# Patient Record
Sex: MALE | Race: WHITE | NOT HISPANIC OR LATINO | Employment: FULL TIME | URBAN - METROPOLITAN AREA
[De-identification: names, ages, dates, MRNs, and addresses within clinical notes are randomized per-mention and may not be internally consistent; named-entity substitution may affect disease eponyms.]

---

## 2018-06-18 ENCOUNTER — TELEPHONE (OUTPATIENT)
Dept: BARIATRICS | Facility: CLINIC | Age: 48
End: 2018-06-18

## 2018-06-18 NOTE — TELEPHONE ENCOUNTER
Spoke with patient about new office in [facility]  Patient declined to schedule at this time  Advised that I will send him a letter with our contact information

## 2018-07-02 ENCOUNTER — OFFICE VISIT (OUTPATIENT)
Dept: URGENT CARE | Facility: MEDICAL CENTER | Age: 48
End: 2018-07-02
Payer: COMMERCIAL

## 2018-07-02 VITALS — RESPIRATION RATE: 20 BRPM | OXYGEN SATURATION: 96 % | TEMPERATURE: 97 F | HEART RATE: 81 BPM | WEIGHT: 315 LBS

## 2018-07-02 DIAGNOSIS — L23.7 POISON IVY DERMATITIS: Primary | ICD-10-CM

## 2018-07-02 DIAGNOSIS — L03.113 CELLULITIS OF RIGHT UPPER EXTREMITY: ICD-10-CM

## 2018-07-02 PROCEDURE — 99203 OFFICE O/P NEW LOW 30 MIN: CPT | Performed by: PHYSICIAN ASSISTANT

## 2018-07-02 PROCEDURE — S9088 SERVICES PROVIDED IN URGENT: HCPCS | Performed by: PHYSICIAN ASSISTANT

## 2018-07-02 RX ORDER — PREDNISONE 10 MG/1
TABLET ORAL
Qty: 12 TABLET | Refills: 0 | Status: SHIPPED | OUTPATIENT
Start: 2018-07-02 | End: 2021-10-25 | Stop reason: ALTCHOICE

## 2018-07-02 RX ORDER — METHYLPREDNISOLONE SODIUM SUCCINATE 125 MG/2ML
125 INJECTION, POWDER, LYOPHILIZED, FOR SOLUTION INTRAMUSCULAR; INTRAVENOUS ONCE
Status: COMPLETED | OUTPATIENT
Start: 2018-07-02 | End: 2018-07-02

## 2018-07-02 RX ORDER — SULFAMETHOXAZOLE AND TRIMETHOPRIM 800; 160 MG/1; MG/1
1 TABLET ORAL EVERY 12 HOURS SCHEDULED
Qty: 20 TABLET | Refills: 0 | Status: SHIPPED | OUTPATIENT
Start: 2018-07-02 | End: 2018-07-12

## 2018-07-02 RX ADMIN — METHYLPREDNISOLONE SODIUM SUCCINATE 125 MG: 125 INJECTION, POWDER, LYOPHILIZED, FOR SOLUTION INTRAMUSCULAR; INTRAVENOUS at 17:58

## 2018-07-03 NOTE — PROGRESS NOTES
[facility]        NAME: Candelaria River is a 50 y o  male  : 1970    MRN: [id number]  DATE: 2018  TIME: 9:42 PM    Assessment and Plan   Poison ivy dermatitis [L23 7]  1  Poison ivy dermatitis  methylPREDNISolone sodium succinate (Solu-MEDROL) injection 125 mg    predniSONE 10 mg tablet    hydrocortisone 2 5 % cream   2  Cellulitis of right upper extremity  sulfamethoxazole-trimethoprim (BACTRIM DS) 800-160 mg per tablet         Patient Instructions     Solu-medrol given in office  Start prednisone taper tomorrow: 30 mg x 2 days, 20 mg x 2 days, 10 mg x 2 days  Take bactrim twice daily for 10 days  Take with food and eat yogurt or a probiotic to decrease GI upset  Use hydrocortisone cream up to 4 times daily as needed for itching  Watch for signs of worsening infection including increased swelling, redness, drainage, pain, streaking or fevers and follow up for this  Follow up with PCP in 3-5 days  Proceed to  ER if symptoms worsen  Chief Complaint     Chief Complaint   Patient presents with    Rash     Itchy rash on all extremeties  Eyes itchy x 6 days  History of Present Illness       This is a 50year old male presenting for poison ivy rash x 6 days  The rash is pruritic and on bilateral arms, legs, and a few spots on the face  He feels that his eyes are beginning to itch as well  He states that the area on the right forearm has gotten much worse, is red and painful with drainage  No fevers, streaking, shortness of breath  Review of Systems   Review of Systems   Constitutional: Negative for chills, fatigue and fever  Respiratory: Negative for cough and shortness of breath  Musculoskeletal: Negative for joint swelling  Skin: Positive for rash  Neurological: Negative for weakness           Current Medications       Current Outpatient Prescriptions:     hydrocortisone 2 5 % cream, Apply topically 4 (four) times a day as needed for irritation or rash, Disp: 20 g, Rfl: 0    predniSONE 10 mg tablet, Take 30 mg x 2 days, 20 mg x 2 days, 10 mg x 2 days  , Disp: 12 tablet, Rfl: 0    sulfamethoxazole-trimethoprim (BACTRIM DS) 800-160 mg per tablet, Take 1 tablet by mouth every 12 (twelve) hours for 10 days, Disp: 20 tablet, Rfl: 0  No current facility-administered medications for this visit  Current Allergies     Allergies as of 07/02/2018 - Reviewed 07/02/2018   Allergen Reaction Noted    Amoxicillin  05/07/2012            The following portions of the patient's history were reviewed and updated as appropriate: allergies, current medications, past family history, past medical history, past social history, past surgical history and problem list      History reviewed  No pertinent past medical history  Past Surgical History:   Procedure Laterality Date    BARIATRIC SURGERY         Family History   Problem Relation Age of Onset    Diabetes Mother     Kidney disease Mother     Heart disease Mother          Medications have been verified  Objective   Pulse 81   Temp (!) 97 °F (36 1 °C) (Temporal)   Resp 20   Wt (!) 158 kg (348 lb 3 2 oz)   SpO2 96%        Physical Exam     Physical Exam   Constitutional: He appears well-developed and well-nourished  No distress  HENT:   Head: Normocephalic and atraumatic  Right Ear: External ear normal    Left Ear: External ear normal    Nose: Nose normal    Eyes: Conjunctivae are normal  Pupils are equal, round, and reactive to light  Cardiovascular: Normal rate, regular rhythm and normal heart sounds  Pulmonary/Chest: Effort normal and breath sounds normal  No respiratory distress  He has no wheezes  He has no rales  Neurological: He is alert  Skin: Skin is warm and dry  Rash noted  He is not diaphoretic  There is an erythematous, vesicular, excoriated rash on bilateral upper legs, bilateral arms, and a few spots on the forehead  The rash on the right forearm is very erythematous, edematous, with TTP   No crusting, drainage, or streaking  Nursing note and vitals reviewed

## 2020-03-03 ENCOUNTER — APPOINTMENT (OUTPATIENT)
Dept: LAB | Facility: MEDICAL CENTER | Age: 50
End: 2020-03-03
Payer: COMMERCIAL

## 2020-03-03 ENCOUNTER — TRANSCRIBE ORDERS (OUTPATIENT)
Dept: ADMINISTRATIVE | Facility: HOSPITAL | Age: 50
End: 2020-03-03

## 2020-03-03 DIAGNOSIS — E78.5 HYPERLIPIDEMIA, UNSPECIFIED HYPERLIPIDEMIA TYPE: ICD-10-CM

## 2020-03-03 DIAGNOSIS — N40.0 BENIGN PROSTATIC HYPERPLASIA, UNSPECIFIED WHETHER LOWER URINARY TRACT SYMPTOMS PRESENT: ICD-10-CM

## 2020-03-03 DIAGNOSIS — E78.5 HYPERLIPIDEMIA, UNSPECIFIED HYPERLIPIDEMIA TYPE: Primary | ICD-10-CM

## 2020-03-03 LAB
ALBUMIN SERPL BCP-MCNC: 4 G/DL (ref 3.5–5)
ALP SERPL-CCNC: 59 U/L (ref 46–116)
ALT SERPL W P-5'-P-CCNC: 46 U/L (ref 12–78)
ANION GAP SERPL CALCULATED.3IONS-SCNC: 5 MMOL/L (ref 4–13)
AST SERPL W P-5'-P-CCNC: 26 U/L (ref 5–45)
BILIRUB SERPL-MCNC: 0.7 MG/DL (ref 0.2–1)
BUN SERPL-MCNC: 13 MG/DL (ref 5–25)
CALCIUM SERPL-MCNC: 9 MG/DL (ref 8.3–10.1)
CHLORIDE SERPL-SCNC: 110 MMOL/L (ref 100–108)
CHOLEST SERPL-MCNC: 155 MG/DL (ref 50–200)
CO2 SERPL-SCNC: 25 MMOL/L (ref 21–32)
CREAT SERPL-MCNC: 0.95 MG/DL (ref 0.6–1.3)
GFR SERPL CREATININE-BSD FRML MDRD: 93 ML/MIN/1.73SQ M
GLUCOSE P FAST SERPL-MCNC: 95 MG/DL (ref 65–99)
HDLC SERPL-MCNC: 23 MG/DL
LDLC SERPL CALC-MCNC: 116 MG/DL (ref 0–100)
LDLC SERPL DIRECT ASSAY-MCNC: 100 MG/DL (ref 0–100)
NONHDLC SERPL-MCNC: 132 MG/DL
POTASSIUM SERPL-SCNC: 3.8 MMOL/L (ref 3.5–5.3)
PROT SERPL-MCNC: 7.4 G/DL (ref 6.4–8.2)
PSA SERPL-MCNC: 0.5 NG/ML (ref 0–4)
SODIUM SERPL-SCNC: 140 MMOL/L (ref 136–145)
TRIGL SERPL-MCNC: 82 MG/DL

## 2020-03-03 PROCEDURE — 80061 LIPID PANEL: CPT

## 2020-03-03 PROCEDURE — G0103 PSA SCREENING: HCPCS

## 2020-03-03 PROCEDURE — 80053 COMPREHEN METABOLIC PANEL: CPT

## 2020-03-03 PROCEDURE — 36415 COLL VENOUS BLD VENIPUNCTURE: CPT

## 2020-03-03 PROCEDURE — 83721 ASSAY OF BLOOD LIPOPROTEIN: CPT

## 2020-10-30 ENCOUNTER — APPOINTMENT (EMERGENCY)
Dept: CT IMAGING | Facility: HOSPITAL | Age: 50
End: 2020-10-30
Payer: COMMERCIAL

## 2020-10-30 ENCOUNTER — HOSPITAL ENCOUNTER (EMERGENCY)
Facility: HOSPITAL | Age: 50
Discharge: HOME | End: 2020-10-31
Attending: EMERGENCY MEDICINE | Admitting: EMERGENCY MEDICINE
Payer: COMMERCIAL

## 2020-10-30 ENCOUNTER — APPOINTMENT (EMERGENCY)
Dept: RADIOLOGY | Facility: HOSPITAL | Age: 50
End: 2020-10-30
Payer: COMMERCIAL

## 2020-10-30 ENCOUNTER — APPOINTMENT (EMERGENCY)
Dept: ULTRASOUND IMAGING | Facility: HOSPITAL | Age: 50
End: 2020-10-30
Payer: COMMERCIAL

## 2020-10-30 VITALS
OXYGEN SATURATION: 92 % | SYSTOLIC BLOOD PRESSURE: 124 MMHG | HEART RATE: 52 BPM | HEIGHT: 73 IN | DIASTOLIC BLOOD PRESSURE: 78 MMHG | BODY MASS INDEX: 41.75 KG/M2 | WEIGHT: 315 LBS | RESPIRATION RATE: 18 BRPM | TEMPERATURE: 98.9 F

## 2020-10-30 DIAGNOSIS — M77.52 RETROCALCANEAL BURSITIS, LEFT: Primary | ICD-10-CM

## 2020-10-30 LAB
ALBUMIN SERPL BCP-MCNC: 3.7 G/DL (ref 3.5–5)
ALP SERPL-CCNC: 61 U/L (ref 46–116)
ALT SERPL W P-5'-P-CCNC: 34 U/L (ref 12–78)
ANION GAP SERPL CALCULATED.3IONS-SCNC: 12 MMOL/L (ref 4–13)
APTT PPP: 27 SECONDS (ref 23–37)
AST SERPL W P-5'-P-CCNC: 16 U/L (ref 5–45)
BASOPHILS # BLD AUTO: 0.06 THOUSANDS/ΜL (ref 0–0.1)
BASOPHILS NFR BLD AUTO: 1 % (ref 0–1)
BILIRUB SERPL-MCNC: 0.43 MG/DL (ref 0.2–1)
BUN SERPL-MCNC: 13 MG/DL (ref 5–25)
CALCIUM SERPL-MCNC: 8.9 MG/DL (ref 8.3–10.1)
CHLORIDE SERPL-SCNC: 107 MMOL/L (ref 100–108)
CK SERPL-CCNC: 68 U/L (ref 39–308)
CO2 SERPL-SCNC: 24 MMOL/L (ref 21–32)
CREAT SERPL-MCNC: 0.93 MG/DL (ref 0.6–1.3)
CRP SERPL QL: 5 MG/L
EOSINOPHIL # BLD AUTO: 0.28 THOUSAND/ΜL (ref 0–0.61)
EOSINOPHIL NFR BLD AUTO: 3 % (ref 0–6)
ERYTHROCYTE [DISTWIDTH] IN BLOOD BY AUTOMATED COUNT: 11.4 % (ref 11.6–15.1)
GFR SERPL CREATININE-BSD FRML MDRD: 95 ML/MIN/1.73SQ M
GLUCOSE SERPL-MCNC: 124 MG/DL (ref 65–140)
HCT VFR BLD AUTO: 42.8 % (ref 36.5–49.3)
HGB BLD-MCNC: 14.6 G/DL (ref 12–17)
IMM GRANULOCYTES # BLD AUTO: 0.02 THOUSAND/UL (ref 0–0.2)
IMM GRANULOCYTES NFR BLD AUTO: 0 % (ref 0–2)
INR PPP: 1 (ref 0.84–1.19)
LACTATE SERPL-SCNC: 1.1 MMOL/L (ref 0.5–2)
LYMPHOCYTES # BLD AUTO: 2.56 THOUSANDS/ΜL (ref 0.6–4.47)
LYMPHOCYTES NFR BLD AUTO: 29 % (ref 14–44)
MCH RBC QN AUTO: 31.9 PG (ref 26.8–34.3)
MCHC RBC AUTO-ENTMCNC: 34.1 G/DL (ref 31.4–37.4)
MCV RBC AUTO: 94 FL (ref 82–98)
MONOCYTES # BLD AUTO: 0.87 THOUSAND/ΜL (ref 0.17–1.22)
MONOCYTES NFR BLD AUTO: 10 % (ref 4–12)
NEUTROPHILS # BLD AUTO: 5.09 THOUSANDS/ΜL (ref 1.85–7.62)
NEUTS SEG NFR BLD AUTO: 57 % (ref 43–75)
NRBC BLD AUTO-RTO: 0 /100 WBCS
PLATELET # BLD AUTO: 242 THOUSANDS/UL (ref 149–390)
PMV BLD AUTO: 10.5 FL (ref 8.9–12.7)
POTASSIUM SERPL-SCNC: 3.9 MMOL/L (ref 3.5–5.3)
PROT SERPL-MCNC: 7.2 G/DL (ref 6.4–8.2)
PROTHROMBIN TIME: 13.3 SECONDS (ref 11.6–14.5)
RBC # BLD AUTO: 4.57 MILLION/UL (ref 3.88–5.62)
SODIUM SERPL-SCNC: 143 MMOL/L (ref 136–145)
WBC # BLD AUTO: 8.88 THOUSAND/UL (ref 4.31–10.16)

## 2020-10-30 PROCEDURE — 93971 EXTREMITY STUDY: CPT

## 2020-10-30 PROCEDURE — 80053 COMPREHEN METABOLIC PANEL: CPT | Performed by: EMERGENCY MEDICINE

## 2020-10-30 PROCEDURE — 96375 TX/PRO/DX INJ NEW DRUG ADDON: CPT

## 2020-10-30 PROCEDURE — 99284 EMERGENCY DEPT VISIT MOD MDM: CPT

## 2020-10-30 PROCEDURE — 99284 EMERGENCY DEPT VISIT MOD MDM: CPT | Performed by: EMERGENCY MEDICINE

## 2020-10-30 PROCEDURE — 73701 CT LOWER EXTREMITY W/DYE: CPT

## 2020-10-30 PROCEDURE — 87040 BLOOD CULTURE FOR BACTERIA: CPT | Performed by: EMERGENCY MEDICINE

## 2020-10-30 PROCEDURE — 85610 PROTHROMBIN TIME: CPT | Performed by: EMERGENCY MEDICINE

## 2020-10-30 PROCEDURE — 86140 C-REACTIVE PROTEIN: CPT | Performed by: EMERGENCY MEDICINE

## 2020-10-30 PROCEDURE — 82550 ASSAY OF CK (CPK): CPT | Performed by: EMERGENCY MEDICINE

## 2020-10-30 PROCEDURE — 96365 THER/PROPH/DIAG IV INF INIT: CPT

## 2020-10-30 PROCEDURE — 36415 COLL VENOUS BLD VENIPUNCTURE: CPT | Performed by: EMERGENCY MEDICINE

## 2020-10-30 PROCEDURE — 83605 ASSAY OF LACTIC ACID: CPT | Performed by: EMERGENCY MEDICINE

## 2020-10-30 PROCEDURE — 73630 X-RAY EXAM OF FOOT: CPT

## 2020-10-30 PROCEDURE — 85730 THROMBOPLASTIN TIME PARTIAL: CPT | Performed by: EMERGENCY MEDICINE

## 2020-10-30 PROCEDURE — 85025 COMPLETE CBC W/AUTO DIFF WBC: CPT | Performed by: EMERGENCY MEDICINE

## 2020-10-30 RX ORDER — KETOROLAC TROMETHAMINE 30 MG/ML
15 INJECTION, SOLUTION INTRAMUSCULAR; INTRAVENOUS ONCE
Status: COMPLETED | OUTPATIENT
Start: 2020-10-30 | End: 2020-10-30

## 2020-10-30 RX ORDER — OXYCODONE HYDROCHLORIDE AND ACETAMINOPHEN 5; 325 MG/1; MG/1
1 TABLET ORAL EVERY 6 HOURS PRN
Qty: 5 TABLET | Refills: 0 | Status: SHIPPED | OUTPATIENT
Start: 2020-10-30

## 2020-10-30 RX ORDER — OXYCODONE HYDROCHLORIDE AND ACETAMINOPHEN 5; 325 MG/1; MG/1
1 TABLET ORAL ONCE
Status: COMPLETED | OUTPATIENT
Start: 2020-10-30 | End: 2020-10-30

## 2020-10-30 RX ORDER — CEPHALEXIN 500 MG/1
500 CAPSULE ORAL 3 TIMES DAILY
Qty: 21 CAPSULE | Refills: 0 | Status: SHIPPED | OUTPATIENT
Start: 2020-10-30 | End: 2020-11-06

## 2020-10-30 RX ORDER — DICLOFENAC SODIUM 75 MG/1
75 TABLET, DELAYED RELEASE ORAL 2 TIMES DAILY
Qty: 20 TABLET | Refills: 0 | Status: SHIPPED | OUTPATIENT
Start: 2020-10-30

## 2020-10-30 RX ADMIN — CEFEPIME HYDROCHLORIDE 2000 MG: 2 INJECTION, POWDER, FOR SOLUTION INTRAVENOUS at 21:54

## 2020-10-30 RX ADMIN — IOHEXOL 100 ML: 350 INJECTION, SOLUTION INTRAVENOUS at 22:45

## 2020-10-30 RX ADMIN — OXYCODONE HYDROCHLORIDE AND ACETAMINOPHEN 1 TABLET: 5; 325 TABLET ORAL at 21:06

## 2020-10-30 RX ADMIN — KETOROLAC TROMETHAMINE 15 MG: 30 INJECTION, SOLUTION INTRAMUSCULAR at 22:44

## 2020-10-31 PROCEDURE — 93971 EXTREMITY STUDY: CPT | Performed by: SURGERY

## 2020-11-05 LAB
BACTERIA BLD CULT: NORMAL
BACTERIA BLD CULT: NORMAL

## 2020-11-18 ENCOUNTER — TRANSCRIBE ORDERS (OUTPATIENT)
Dept: ADMINISTRATIVE | Facility: HOSPITAL | Age: 50
End: 2020-11-18

## 2020-11-18 DIAGNOSIS — G47.30 SLEEP APNEA: Primary | ICD-10-CM

## 2020-12-08 ENCOUNTER — TRANSCRIBE ORDERS (OUTPATIENT)
Dept: SLEEP CENTER | Facility: CLINIC | Age: 50
End: 2020-12-08

## 2020-12-12 ENCOUNTER — TRANSCRIBE ORDERS (OUTPATIENT)
Dept: ADMINISTRATIVE | Facility: HOSPITAL | Age: 50
End: 2020-12-12

## 2020-12-12 ENCOUNTER — LAB (OUTPATIENT)
Dept: LAB | Facility: MEDICAL CENTER | Age: 50
End: 2020-12-12
Payer: COMMERCIAL

## 2020-12-12 DIAGNOSIS — M10.9 GOUT, UNSPECIFIED CAUSE, UNSPECIFIED CHRONICITY, UNSPECIFIED SITE: Primary | ICD-10-CM

## 2020-12-12 DIAGNOSIS — M10.9 GOUT, UNSPECIFIED CAUSE, UNSPECIFIED CHRONICITY, UNSPECIFIED SITE: ICD-10-CM

## 2020-12-12 LAB — URATE SERPL-MCNC: 7 MG/DL (ref 4.2–8)

## 2020-12-12 PROCEDURE — 36415 COLL VENOUS BLD VENIPUNCTURE: CPT

## 2020-12-12 PROCEDURE — 84550 ASSAY OF BLOOD/URIC ACID: CPT

## 2020-12-12 PROCEDURE — 86618 LYME DISEASE ANTIBODY: CPT

## 2020-12-15 LAB — B BURGDOR IGG+IGM SER-ACNC: 29

## 2021-03-01 ENCOUNTER — TRANSCRIBE ORDERS (OUTPATIENT)
Dept: ADMINISTRATIVE | Facility: HOSPITAL | Age: 51
End: 2021-03-01

## 2021-03-01 DIAGNOSIS — G47.33 OBSTRUCTIVE SLEEP APNEA (ADULT) (PEDIATRIC): Primary | ICD-10-CM

## 2021-03-05 ENCOUNTER — TELEPHONE (OUTPATIENT)
Dept: SLEEP CENTER | Facility: CLINIC | Age: 51
End: 2021-03-05

## 2021-03-05 NOTE — TELEPHONE ENCOUNTER
----- Message from June Barrett MD sent at 3/4/2021  3:45 PM EST -----   approved  ----- Message -----  From: Roseline Burton  Sent: 5/4/4399  10:45 AM EST  To: Sleep Medicine [facility] Provider    This sleep study needs approval      If approved please sign and return to clerical pool  If denied please include reasons why  Also provide alternative testing if warranted  Please sign and return to clerical pool

## 2021-04-29 ENCOUNTER — HOSPITAL ENCOUNTER (OUTPATIENT)
Dept: SLEEP CENTER | Facility: CLINIC | Age: 51
Discharge: HOME | End: 2021-04-29
Payer: COMMERCIAL

## 2021-04-29 DIAGNOSIS — G47.33 OBSTRUCTIVE SLEEP APNEA (ADULT) (PEDIATRIC): ICD-10-CM

## 2021-04-29 PROCEDURE — G0399 HOME SLEEP TEST/TYPE 3 PORTA: HCPCS

## 2021-04-30 NOTE — PROGRESS NOTES
Home Sleep Study Documentation    Pre-Sleep Home Study:    Set-up and instructions performed by: Kaitlin Briggs     Technician performed demonstration for Patient: yes    Return demonstration performed by Patient: yes    Written instructions provided to Patient: yes    Patient signed consent form: yes        Post-Sleep Home Study:    Additional comments by Patient: Diary not completed  Home Sleep Study Failed:no:    Failure reason: N/A    Reported or Detected: N/A    Scored by: E Lander Kussmaul

## 2021-05-04 PROCEDURE — 95806 SLEEP STUDY UNATT&RESP EFFT: CPT | Performed by: INTERNAL MEDICINE

## 2021-05-13 ENCOUNTER — TELEPHONE (OUTPATIENT)
Dept: SLEEP CENTER | Facility: CLINIC | Age: 51
End: 2021-05-13

## 2021-05-13 NOTE — TELEPHONE ENCOUNTER
Advised patient sleep study shows severe FIFI  Patient would like to have consult at [place]  Dr Shannon Bojorquez 's office number provided to the patient

## 2021-07-21 ENCOUNTER — TELEPHONE (OUTPATIENT)
Dept: PULMONOLOGY | Facility: CLINIC | Age: 51
End: 2021-07-21

## 2021-07-21 ENCOUNTER — OFFICE VISIT (OUTPATIENT)
Dept: PULMONOLOGY | Facility: CLINIC | Age: 51
End: 2021-07-21
Payer: COMMERCIAL

## 2021-07-21 VITALS
OXYGEN SATURATION: 93 % | BODY MASS INDEX: 41.75 KG/M2 | HEIGHT: 73 IN | WEIGHT: 315 LBS | DIASTOLIC BLOOD PRESSURE: 80 MMHG | TEMPERATURE: 96.8 F | HEART RATE: 72 BPM | SYSTOLIC BLOOD PRESSURE: 124 MMHG

## 2021-07-21 DIAGNOSIS — G47.19 EXCESSIVE DAYTIME SLEEPINESS: ICD-10-CM

## 2021-07-21 DIAGNOSIS — E66.01 MORBID OBESITY (HCC): ICD-10-CM

## 2021-07-21 DIAGNOSIS — G47.33 OSA (OBSTRUCTIVE SLEEP APNEA): Primary | ICD-10-CM

## 2021-07-21 PROCEDURE — 99244 OFF/OP CNSLTJ NEW/EST MOD 40: CPT | Performed by: INTERNAL MEDICINE

## 2021-07-21 NOTE — PROGRESS NOTES
Assessment/Plan:     Diagnoses and all orders for this visit:    FIFI (obstructive sleep apnea)  -     CPAP Auto New DME    Excessive daytime sleepiness    Morbid obesity (HCC)         excessive daytime sleepiness in this patient with the prior history of obstructive sleep apnea has been on CPAP his machine is very old, and needs a replacement   His recent home sleep study was reviewed with moderate to severe obstructive sleep apnea with an AHI of 26  Etiology pathogenesis of obstructive sleep apnea discussed in detail   Consequences of untreated sleep apnea discussed understands and verbalizes   Will order an auto CPAP in he will get it set up  Once he starts if there is any concerns he will give us a call  Need for compliance with the CPAP machine discussed   Will follow-up in 3 months with the CPAP download compliance   Recommend weight loss  Discussed with the patient regarding following up with the bariatric clinic for weight management, he is currently status post gastric sleeve thinking about following up for medical weight management he states  Cautioned against driving when sleepy  Return in about 3 months (around 10/21/2021)  All questions are answered to the patient's satisfaction and understanding  He verbalizes understanding  He is encouraged to call with any further questions or concerns  Portions of the record may have been created with voice recognition software  Occasional wrong word or "sound a like" substitutions may have occurred due to the inherent limitations of voice recognition software  Read the chart carefully and recognize, using context, where substitutions have occurred  a    Electronically Signed by Xochitl Pineda MD    ______________________________________________________________________    Chief Complaint:   Chief Complaint   Patient presents with    Sleep Apnea        Patient ID: Jung Narvaez is a 46 y o  y o  male has a past medical history of FIFI (obstructive sleep apnea)  7/21/2021  Patient presents today for initial visit  A 46year-old gentleman history of obstructive sleep apnea several years has been on CPAP also status post gastric sleeve done and he states he lost significant weight and has gained back again  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  His daily sleep sleep schedule is he goes to bed at around 10:00 p m  falls asleep right away and is out of bed at 6:00 a m  has 1-2 nocturnal awakenings for unclear reasons, states he falls back asleep right away and when he is out of the bed he still tired and fatigued  Does not take a nap during the daytime  No history of any early morning headaches no symptoms related to restless legs also with the use of the CPAP he states the snoring  Is completely gone he states  Occupational/Exposure history:  Works as a banker  Pets/Enviroment: none  Travel history: none  Review of Systems   Constitutional: Positive for fatigue  HENT: Negative  Respiratory: Positive for shortness of breath  Cardiovascular: Negative  Gastrointestinal: Negative  Endocrine: Negative  Genitourinary: Negative  Musculoskeletal: Negative  Allergic/Immunologic: Negative  Neurological: Negative  Hematological: Negative  Psychiatric/Behavioral: Positive for sleep disturbance  Social history: He reports that he has never smoked  He has never used smokeless tobacco  He reports current alcohol use  He reports that he does not use drugs  Past surgical history:   Past Surgical History:   Procedure Laterality Date    APPENDECTOMY      BARIATRIC SURGERY       Family history:   Family History   Problem Relation Age of Onset    Diabetes Mother     Kidney disease Mother     Heart disease Mother          There is no immunization history on file for this patient    Current Outpatient Medications   Medication Sig Dispense Refill    diclofenac (VOLTAREN) 75 mg EC tablet Take 1 tablet (75 mg total) by mouth 2 (two) times a day 20 tablet 0    hydrocortisone 2 5 % cream Apply topically 4 (four) times a day as needed for irritation or rash 20 g 0    oxyCODONE-acetaminophen (PERCOCET) 5-325 mg per tablet Take 1 tablet by mouth every 6 (six) hours as needed for moderate painMax Daily Amount: 4 tablets 5 tablet 0    predniSONE 10 mg tablet Take 30 mg x 2 days, 20 mg x 2 days, 10 mg x 2 days  (Patient not taking: Reported on 7/21/2021) 12 tablet 0     No current facility-administered medications for this visit  Allergies: Amoxicillin, Latex, and Penicillins    Objective:  Vitals:    07/21/21 0835   BP: 124/80   Pulse: 72   Temp: (!) 96 8 °F (36 °C)   SpO2: 93%   Weight: (!) 163 kg (360 lb)   Height: 6' 1" (1 854 m)   Oxygen Therapy  SpO2: 93 %    Wt Readings from Last 3 Encounters:   07/21/21 (!) 163 kg (360 lb)   10/30/20 (!) 162 kg (357 lb 2 3 oz)   07/02/18 (!) 158 kg (348 lb 3 2 oz)     Body mass index is 47 5 kg/m²  Physical Exam  Constitutional:       Appearance: He is well-developed  HENT:      Head: Normocephalic and atraumatic  Eyes:      Pupils: Pupils are equal, round, and reactive to light  Neck:      Comments: Short and wide neck  Cardiovascular:      Rate and Rhythm: Normal rate and regular rhythm  Heart sounds: Normal heart sounds  Pulmonary:      Effort: Pulmonary effort is normal       Breath sounds: Normal breath sounds  Musculoskeletal:         General: Normal range of motion  Cervical back: Normal range of motion and neck supple  Skin:     General: Skin is warm and dry  Neurological:      Mental Status: He is alert and oriented to person, place, and time  Psychiatric:         Behavior: Behavior normal            Diagnostics:  I have personally reviewed pertinent reports      ESS: Total score: 14

## 2021-10-25 ENCOUNTER — OFFICE VISIT (OUTPATIENT)
Dept: PULMONOLOGY | Facility: CLINIC | Age: 51
End: 2021-10-25
Payer: COMMERCIAL

## 2021-10-25 VITALS
HEIGHT: 73 IN | OXYGEN SATURATION: 96 % | TEMPERATURE: 97 F | SYSTOLIC BLOOD PRESSURE: 124 MMHG | WEIGHT: 315 LBS | BODY MASS INDEX: 41.75 KG/M2 | DIASTOLIC BLOOD PRESSURE: 80 MMHG | HEART RATE: 71 BPM

## 2021-10-25 DIAGNOSIS — G47.33 OSA (OBSTRUCTIVE SLEEP APNEA): Primary | ICD-10-CM

## 2021-10-25 DIAGNOSIS — G47.19 EXCESSIVE DAYTIME SLEEPINESS: ICD-10-CM

## 2021-10-25 DIAGNOSIS — E66.01 MORBID OBESITY (HCC): ICD-10-CM

## 2021-10-25 PROCEDURE — 99214 OFFICE O/P EST MOD 30 MIN: CPT | Performed by: INTERNAL MEDICINE

## 2022-09-13 ENCOUNTER — OFFICE VISIT (OUTPATIENT)
Dept: DERMATOLOGY | Facility: CLINIC | Age: 52
End: 2022-09-13
Payer: COMMERCIAL

## 2022-09-13 VITALS — HEIGHT: 73 IN | BODY MASS INDEX: 41.75 KG/M2 | WEIGHT: 315 LBS | TEMPERATURE: 96.8 F

## 2022-09-13 DIAGNOSIS — D22.70 MULTIPLE BENIGN MELANOCYTIC NEVI OF UPPER AND LOWER EXTREMITIES AND TRUNK: Primary | ICD-10-CM

## 2022-09-13 DIAGNOSIS — L56.8 ACTINIC CHEILITIS: ICD-10-CM

## 2022-09-13 DIAGNOSIS — L81.4 LENTIGO: ICD-10-CM

## 2022-09-13 DIAGNOSIS — D22.60 MULTIPLE BENIGN MELANOCYTIC NEVI OF UPPER AND LOWER EXTREMITIES AND TRUNK: Primary | ICD-10-CM

## 2022-09-13 DIAGNOSIS — D18.01 CHERRY ANGIOMA: ICD-10-CM

## 2022-09-13 DIAGNOSIS — D22.5 MULTIPLE BENIGN MELANOCYTIC NEVI OF UPPER AND LOWER EXTREMITIES AND TRUNK: Primary | ICD-10-CM

## 2022-09-13 DIAGNOSIS — L85.3 XEROSIS OF SKIN: ICD-10-CM

## 2022-09-13 PROCEDURE — 99203 OFFICE O/P NEW LOW 30 MIN: CPT | Performed by: DERMATOLOGY

## 2022-09-13 PROCEDURE — 17000 DESTRUCT PREMALG LESION: CPT | Performed by: DERMATOLOGY

## 2022-09-13 NOTE — PROGRESS NOTES
[facility] Note     Patient Name: Lady Wheeler  Encounter Date: 09/13/2022     Have you been cared for by a [facility] in the last 3 years and, if so, which one? No    · Have you traveled outside of the [address] [place] [address] in the past 3 months or outside of the [facility] area in the last 2 weeks? No     May we call your Preferred Phone number to discuss your specific medical information? Yes     May we leave a detailed message that includes your specific medical information? Yes      Today's Chief Concerns:   Concern #1:  Full body exam       Past Medical History:  Have you personally ever had or currently have any of the following? · Skin cancer (such as Melanoma, Basal Cell Carcinoma, Squamous Cell Carcinoma? (If Yes, please provide more detail)- No  · Eczema: No  · Psoriasis: No  · HIV/AIDS: No  · Hepatitis B or C: No  · Tuberculosis: No  · Systemic Immunosuppression such as Diabetes, Biologic or Immunotherapy, Chemotherapy, Organ Transplantation, Bone Marrow Transplantation (If YES, please provide more detail): No  · Radiation Treatment (If YES, please provide more detail): No  · Any other major medical conditions/concerns? (If Yes, which types)- No    Social History:     What is/was your primary occupation?  What are your hobbies/past-times? Family History:  Have any of your "first degree relatives" (parent, brother, sister, or child) had any of the following       · Skin cancer such as Melanoma or Merkel Cell Carcinoma or Pancreatic Cancer? No  · Eczema, Asthma, Hay Fever or Seasonal Allergies: No  · Psoriasis or Psoriatic Arthritis: No  · Do any other medical conditions seem to run in your family? If Yes, what condition and which relatives?   No    Current Medications:         Current Outpatient Medications:     diclofenac (VOLTAREN) 75 mg EC tablet, Take 1 tablet (75 mg total) by mouth 2 (two) times a day, Disp: 20 tablet, Rfl: 0   hydrocortisone 2 5 % cream, Apply topically 4 (four) times a day as needed for irritation or rash, Disp: 20 g, Rfl: 0    oxyCODONE-acetaminophen (PERCOCET) 5-325 mg per tablet, Take 1 tablet by mouth every 6 (six) hours as needed for moderate painMax Daily Amount: 4 tablets, Disp: 5 tablet, Rfl: 0      Review of Systems:  Have you recently had or currently have any of the following? If YES, what are you doing for the problem? · Fever, chills or unintended weight loss: No  · Sudden loss or change in your vision: No  · Nausea, vomiting or blood in your stool: YES  · Painful or swollen joints: No  · Wheezing or cough: No  · Changing mole or non-healing wound: No  · Nosebleeds: No  · Excessive sweating: No  · Easy or prolonged bleeding? No  · Over the last 2 weeks, how often have you been bothered by the following problems? · Taking little interest or pleasure in doing things: 1 - Not at All  · Feeling down, depressed, or hopeless: 1 - Not at All  · Rapid heartbeat with epinephrine:  No    · FEMALES ONLY:    · Are you pregnant or planning to become pregnant? N/A  · Are you currently or planning to be nursing or breast feeding? No    · Any known allergies? Allergies   Allergen Reactions    Amoxicillin     Latex     Penicillins    ·       Physical Exam:     Was a chaperone (Derm Clinical Assistant) present throughout the entire Physical Exam? Yes     Did the Dermatology Team specifically  the patient on the importance of a Full Skin Exam to be sure that nothing is missed clinically?  Yes}  o Did the patient ultimately request or accept a Full Skin Exam?  Yes  o Did the patient specifically refuse to have the areas "under-the-bra" examined by the Dermatologist? No  o Did the patient specifically refuse to have the areas "under-the-underwear" examined by the Dermatologist? No    CONSTITUTIONAL:   Vitals:    09/13/22 0959   Temp: (!) 96 8 °F (36 °C)   TempSrc: Temporal   Weight: (!) 159 kg (350 lb) Height: 6' 1" (1 854 m)       PSYCH: Normal mood and affect  EYES: Normal conjunctiva  ENT: Normal lips and oral mucosa  CARDIOVASCULAR: No edema  RESPIRATORY: Normal respirations  HEME/LYMPH/IMMUNO:  No regional lymphadenopathy except as noted below in "ASSESSMENT AND PLAN BY DIAGNOSIS"    SKIN:  FULL ORGAN SYSTEM EXAM  Hair, Scalp, Ears, Face Normal except as noted below in Assessment   Neck, Cervical Chain Nodes Normal except as noted below in Assessment   Right Arm/Hand/Fingers Normal except as noted below in Assessment   Left Arm/Hand/Fingers Normal except as noted below in Assessment   Chest/Breasts/Axillae Viewed areas Normal except as noted below in Assessment   Abdomen, Umbilicus Normal except as noted below in Assessment   Back/Spine Normal except as noted below in Assessment   Groin/Genitalia/Buttocks    Right Leg, Foot, Toes Normal except as noted below in Assessment   Left Leg, Foot, Toes Normal except as noted below in Assessment        Assessment and Plan by Diagnosis:    History of Present Condition:     Duration:  How long has this been an issue for you?    o  years    Location Affected:  Where on the body is this affecting you? o  trunk and extremities    Quality:  Is there any bleeding, pain, itch, burning/irritation, or redness associated with the skin lesion? o  denies    Severity:  Describe any bleeding, pain, itch, burning/irritation, or redness on a scale of 1 to 10 (with 10 being the worst)  o  denies    Timing:  Does this condition seem to be there pretty constantly or do you notice it more at specific times throughout the day?     o  constant    Context:  Have you ever noticed that this condition seems to be associated with specific activities you do?    o  denies    Modifying Factors:    o Anything that seems to make the condition worse?    -  denies   o What have you tried to do to make the condition better?    -  denies    Associated Signs and Symptoms:  Does this skin lesion seem to be associated with any of the following:  o  denies       1  MELANOCYTIC NEVI ("Moles")    Physical Exam:   Anatomic Location Affected:   Mostly on sun-exposed areas of the trunk and extremities   Morphological Description:  Scattered, 1-4mm round to ovoid, symmetrical-appearing, even bordered, skin colored to dark brown macules/papules, mostly in sun-exposed areas   Pertinent Positives:   Pertinent Negatives:    Assessment and Plan:  Based on a thorough discussion of this condition and the management approach to it (including a comprehensive discussion of the known risks, side effects and potential benefits of treatment), the patient (family) agrees to implement the following specific plan:   When outside we recommend using a wide brim hat, sunglasses, long sleeve and pants, sunscreen with SPF 46+ with reapplication every 2 hours, or SPF specific clothing    Benign, reassured   Annual skin check     Melanocytic Nevi  Melanocytic nevi ("moles") are tan or brown, raised or flat areas of the skin which have an increased number of melanocytes  Melanocytes are the cells in our body which make pigment and account for skin color  Some moles are present at birth (I e , "congenital nevi"), while others come up later in life (i e , "acquired nevi")  The sun can stimulate the body to make more moles  Sunburns are not the only thing that triggers more moles  Chronic sun exposure can do it too  Clinically distinguishing a healthy mole from melanoma may be difficult, even for experienced dermatologists  The "ABCDE's" of moles have been suggested as a means of helping to alert a person to a suspicious mole and the possible increased risk of melanoma  The suggestions for raising alert are as follows:    Asymmetry: Healthy moles tend to be symmetric, while melanomas are often asymmetric    Asymmetry means if you draw a line through the mole, the two halves do not match in color, size, shape, or surface texture  Asymmetry can be a result of rapid enlargement of a mole, the development of a raised area on a previously flat lesion, scaling, ulceration, bleeding or scabbing within the mole  Any mole that starts to demonstrate "asymmetry" should be examined promptly by a board certified dermatologist      Border: Healthy moles tend to have discrete, even borders  The border of a melanoma often blends into the normal skin and does not sharply delineate the mole from normal skin  Any mole that starts to demonstrate "uneven borders" should be examined promptly by a board certified dermatologist      Color: Healthy moles tend to be one color throughout  Melanomas tend to be made up of different colors ranging from dark black, blue, white, or red  Any mole that demonstrates a color change should be examined promptly by a board certified dermatologist      Diameter: Healthy moles tend to be smaller than 0 6 cm in size; an exception are "congenital nevi" that can be larger  Melanomas tend to grow and can often be greater than 0 6 cm (1/4 of an inch, or the size of a pencil eraser)  This is only a guideline, and many normal moles may be larger than 0 6 cm without being unhealthy  Any mole that starts to change in size (small to bigger or bigger to smaller) should be examined promptly by a board certified dermatologist      Evolving: Healthy moles tend to "stay the same "  Melanomas may often show signs of change or evolution such as a change in size, shape, color, or elevation    Any mole that starts to itch, bleed, crust, burn, hurt, or ulcerate or demonstrate a change or evolution should be examined promptly by a board certified dermatologist         2  LENTIGO    Physical Exam:   Anatomic Location Affected:  Trunk and arms    Morphological Description:  Light brown macules   Pertinent Positives:   Pertinent Negatives:    Assessment and Plan:  Based on a thorough discussion of this condition and the management approach to it (including a comprehensive discussion of the known risks, side effects and potential benefits of treatment), the patient (family) agrees to implement the following specific plan:   When outside we recommend using a wide brim hat, sunglasses, long sleeve and pants, sunscreen with SPF 95+ with reapplication every 2 hours, or SPF specific clothing       What is a lentigo? A lentigo is a pigmented flat or slightly raised lesion with a clearly defined edge  Unlike an ephelis (freckle), it does not fade in the winter months  There are several kinds of lentigo  The name lentigo originally referred to its appearance resembling a small lentil  The plural of lentigo is lentigines, although lentigos is also in common use  Who gets lentigines? Lentigines can affect males and females of all ages and races  Solar lentigines are especially prevalent in fair skinned adults  Lentigines associated with syndromes are present at birth or arise during childhood  What causes lentigines? Common forms of lentigo are due to exposure to ultraviolet radiation:   Sun damage including sunburn    Indoor tanning    Phototherapy, especially photochemotherapy (PUVA)    Ionizing radiation, eg radiation therapy, can also cause lentigines  Several familial syndromes associated with widespread lentigines originate from mutations in Jaun-MAP kinase, mTOR signaling and PTEN pathways  What is the treatment for lentigines? Most lentigines are left alone  Attempts to lighten them may not be successful  The following approaches are used:   SPF 50+ broad-spectrum sunscreen    Hydroquinone bleaching cream    Alpha hydroxy acids    Vitamin C    Retinoids    Azelaic acid    Chemical peels  Individual lesions can be permanently removed using:   Cryotherapy    Intense pulsed light    Pigment lasers    How can lentigines be prevented?   Lentigines associated with exposure ultraviolet radiation can be prevented by very careful sun protection  Clothing is more successful at preventing new lentigines than are sunscreens  What is the outlook for lentigines? Lentigines usually persist  They may increase in number with age and sun exposure  Some in sun-protected sites may fade and disappear  3  LARSON ANGIOMAS    Physical Exam:   Anatomic Location Affected:  Trunk and left scalp   Morphological Description:  Scattered cherry red, 1-4 mm papules   Pertinent Positives:   Pertinent Negatives:    Assessment and Plan:  Based on a thorough discussion of this condition and the management approach to it (including a comprehensive discussion of the known risks, side effects and potential benefits of treatment), the patient (family) agrees to implement the following specific plan:   Monitor for changes   Benign, reassured    Assessment and Plan:    Cherry angioma, also known as [employer] spots, are benign vascular skin lesions  A "cherry angioma" is a firm red, blue or purple papule, 0 1-1 cm in diameter  When thrombosed, they can appear black in colour until evaluated with a dermatoscope when the red or purple colour is more easily seen  Cherry angioma may develop on any part of the body but most often appear on the scalp, face, lips and trunk  An angioma is due to proliferating endothelial cells; these are the cells that line the inside of a blood vessel  Angiomas can arise in early life or later in life; the most common type of angioma is a cherry angioma  Cherry angiomas are very common in males and females of any age or race  They are more noticeable in white skin than in skin of colour  They markedly increase in number from about the age of 36  There may be a family history of similar lesions  Eruptive cherry angiomas have been rarely reported to be associated with internal malignancy  The cause of angiomas is unknown   Genetic analysis of cherry angiomas has shown that they frequently carry specific somatic missense mutations in the GNAQ and GNA11 (Q209H) genes, which are involved in other vascular and melanocytic proliferations  4  XEROSIS ("DRY SKIN")    Physical Exam:   Anatomic Location Affected:  diffuse   Morphological Description:  xerosis   Pertinent Positives:   Pertinent Negatives:    Assessment and Plan:  Based on a thorough discussion of this condition and the management approach to it (including a comprehensive discussion of the known risks, side effects and potential benefits of treatment), the patient (family) agrees to implement the following specific plan:   Use moisturizer like Eucerin,Cerave or Aveeno Cream 3 times a day for the dry skin               Dry skin refers to skin that feels dry to touch  Dry skin has a dull surface with a rough, scaly quality  The skin is less pliable and cracked  When dryness is severe, the skin may become inflamed and fissured  Although any body site can be dry, dry skin tends to affect the shins more than any other site  Dry skin is lacking moisture in the outer horny cell layer (stratum corneum) and this results in cracks in the skin surface  Dry skin is also called xerosis, xeroderma or asteatosis (lack of fat)  It can affect males and females of all ages  There is some racial variability in water and lipid content of the skin   Dry skin that starts in early childhood may be one of about 20 types of ichthyosis (fish-scale skin)  There is often a family history of dry skin   Dry skin is commonly seen in people with atopic dermatitis   Nearly everyone > 60 years has dry skin  Dry skin that begins later may be seen in people with certain diseases and conditions     Postmenopausal women   Hypothyroidism   Chronic renal disease    Malnutrition and weight loss    Subclinical dermatitis    Treatment with certain drugs such as oral retinoids, diuretics and epidermal growth factor receptor inhibitors      What is the treatment for dry skin?  The mainstay of treatment of dry skin and ichthyosis is moisturisers/emollients  They should be applied liberally and often enough to:   Reduce itch    Improve the barrier function    Prevent entry of irritants, bacteria    Reduce transepidermal water loss  How can dry skin be prevented? Eliminate aggravating factors:   Reduce the frequency of bathing   A humidifier in winter and air conditioner in summer    Compare having a short shower with a prolonged soak in a bath   Use lukewarm, not hot, water   Replace standard soap with a substitute such as a synthetic detergent cleanser, water-miscible emollient, bath oil, anti-pruritic tar oil, colloidal oatmeal etc     Apply an emollient liberally and often, particularly shortly after bathing, and when itchy  The drier the skin, the thicker this should be, especially on the hands  What is the outlook for dry skin? A tendency to dry skin may persist life-long, or it may improve once contributing factors are controlled  5  ACTINIC CHEILITIS   Physical Exam:   Anatomic Location Affected:  Mid bottom lip    Morphological Description:  Scaly pink papule    Pertinent Positives:   Pertinent Negatives:    Assessment and Plan:  Based on a thorough discussion of this condition and the management approach to it (including a comprehensive discussion of the known risks, side effects and potential benefits of treatment), the patient (family) agrees to implement the following specific plan:   Liquid nitrogen was applied for 10-12 seconds to the skin lesion and the expected blistering or scabbing reaction explained  Do not pick at the area  Patient reminded to expect hypopigmented scars from the procedure  Return if lesion fails to fully resolve        PROCEDURE:  DESTRUCTION OF PRE-MALIGNANT LESIONS  After a thorough discussion of treatment options and risk/benefits/alternatives (including but not limited to local pain, scarring, dyspigmentation, blistering, and possible superinfection), verbal and written consent were obtained and the aforementioned lesions were treated on with cryotherapy using liquid nitrogen x 1 cycle for 5-10 seconds   TOTAL NUMBER of 1 pre-malignant lesions were treated today on the ANATOMIC LOCATION: mid bottom lip   The patient tolerated the procedure well, and after-care instructions were provided      Scribe Attestation    I,:  Shantal Ly MA am acting as a scribe while in the presence of the attending physician :       I,:  Rupa Barnes MD personally performed the services described in this documentation    as scribed in my presence :

## 2022-09-13 NOTE — PATIENT INSTRUCTIONS
1  MELANOCYTIC NEVI ("Moles")  Assessment and Plan:  Based on a thorough discussion of this condition and the management approach to it (including a comprehensive discussion of the known risks, side effects and potential benefits of treatment), the patient (family) agrees to implement the following specific plan:  When outside we recommend using a wide brim hat, sunglasses, long sleeve and pants, sunscreen with SPF 96+ with reapplication every 2 hours, or SPF specific clothing   Benign, reassured  Annual skin check     Melanocytic Nevi  Melanocytic nevi ("moles") are tan or brown, raised or flat areas of the skin which have an increased number of melanocytes  Melanocytes are the cells in our body which make pigment and account for skin color  Some moles are present at birth (I e , "congenital nevi"), while others come up later in life (i e , "acquired nevi")  The sun can stimulate the body to make more moles  Sunburns are not the only thing that triggers more moles  Chronic sun exposure can do it too  Clinically distinguishing a healthy mole from melanoma may be difficult, even for experienced dermatologists  The "ABCDE's" of moles have been suggested as a means of helping to alert a person to a suspicious mole and the possible increased risk of melanoma  The suggestions for raising alert are as follows:    Asymmetry: Healthy moles tend to be symmetric, while melanomas are often asymmetric  Asymmetry means if you draw a line through the mole, the two halves do not match in color, size, shape, or surface texture  Asymmetry can be a result of rapid enlargement of a mole, the development of a raised area on a previously flat lesion, scaling, ulceration, bleeding or scabbing within the mole  Any mole that starts to demonstrate "asymmetry" should be examined promptly by a board certified dermatologist      Border: Healthy moles tend to have discrete, even borders    The border of a melanoma often blends into the normal skin and does not sharply delineate the mole from normal skin  Any mole that starts to demonstrate "uneven borders" should be examined promptly by a board certified dermatologist      Color: Healthy moles tend to be one color throughout  Melanomas tend to be made up of different colors ranging from dark black, blue, white, or red  Any mole that demonstrates a color change should be examined promptly by a board certified dermatologist      Diameter: Healthy moles tend to be smaller than 0 6 cm in size; an exception are "congenital nevi" that can be larger  Melanomas tend to grow and can often be greater than 0 6 cm (1/4 of an inch, or the size of a pencil eraser)  This is only a guideline, and many normal moles may be larger than 0 6 cm without being unhealthy  Any mole that starts to change in size (small to bigger or bigger to smaller) should be examined promptly by a board certified dermatologist      Evolving: Healthy moles tend to "stay the same "  Melanomas may often show signs of change or evolution such as a change in size, shape, color, or elevation  Any mole that starts to itch, bleed, crust, burn, hurt, or ulcerate or demonstrate a change or evolution should be examined promptly by a board certified dermatologist         2  LENTIGO  Assessment and Plan:  Based on a thorough discussion of this condition and the management approach to it (including a comprehensive discussion of the known risks, side effects and potential benefits of treatment), the patient (family) agrees to implement the following specific plan:  When outside we recommend using a wide brim hat, sunglasses, long sleeve and pants, sunscreen with SPF 23+ with reapplication every 2 hours, or SPF specific clothing       What is a lentigo? A lentigo is a pigmented flat or slightly raised lesion with a clearly defined edge  Unlike an ephelis (freckle), it does not fade in the winter months   There are several kinds of lentigo  The name lentigo originally referred to its appearance resembling a small lentil  The plural of lentigo is lentigines, although lentigos is also in common use  Who gets lentigines? Lentigines can affect males and females of all ages and races  Solar lentigines are especially prevalent in fair skinned adults  Lentigines associated with syndromes are present at birth or arise during childhood  What causes lentigines? Common forms of lentigo are due to exposure to ultraviolet radiation:  Sun damage including sunburn   Indoor tanning   Phototherapy, especially photochemotherapy (PUVA)    Ionizing radiation, eg radiation therapy, can also cause lentigines  Several familial syndromes associated with widespread lentigines originate from mutations in Jaun-MAP kinase, mTOR signaling and PTEN pathways  What is the treatment for lentigines? Most lentigines are left alone  Attempts to lighten them may not be successful  The following approaches are used:  SPF 50+ broad-spectrum sunscreen   Hydroquinone bleaching cream   Alpha hydroxy acids   Vitamin C   Retinoids   Azelaic acid   Chemical peels  Individual lesions can be permanently removed using:  Cryotherapy   Intense pulsed light   Pigment lasers    How can lentigines be prevented? Lentigines associated with exposure ultraviolet radiation can be prevented by very careful sun protection  Clothing is more successful at preventing new lentigines than are sunscreens  What is the outlook for lentigines? Lentigines usually persist  They may increase in number with age and sun exposure  Some in sun-protected sites may fade and disappear      3  LARSON ANGIOMAS  Assessment and Plan:  Based on a thorough discussion of this condition and the management approach to it (including a comprehensive discussion of the known risks, side effects and potential benefits of treatment), the patient (family) agrees to implement the following specific plan:  Monitor for changes  Benign, reassured    Assessment and Plan:    Cherry angioma, also known as [employer] spots, are benign vascular skin lesions  A "cherry angioma" is a firm red, blue or purple papule, 0 1-1 cm in diameter  When thrombosed, they can appear black in colour until evaluated with a dermatoscope when the red or purple colour is more easily seen  Cherry angioma may develop on any part of the body but most often appear on the scalp, face, lips and trunk  An angioma is due to proliferating endothelial cells; these are the cells that line the inside of a blood vessel  Angiomas can arise in early life or later in life; the most common type of angioma is a cherry angioma  Cherry angiomas are very common in males and females of any age or race  They are more noticeable in white skin than in skin of colour  They markedly increase in number from about the age of 36  There may be a family history of similar lesions  Eruptive cherry angiomas have been rarely reported to be associated with internal malignancy  The cause of angiomas is unknown  Genetic analysis of cherry angiomas has shown that they frequently carry specific somatic missense mutations in the GNAQ and GNA11 (Q209H) genes, which are involved in other vascular and melanocytic proliferations  4  XEROSIS ("DRY SKIN")  Assessment and Plan:  Based on a thorough discussion of this condition and the management approach to it (including a comprehensive discussion of the known risks, side effects and potential benefits of treatment), the patient (family) agrees to implement the following specific plan:  Use moisturizer like Eucerin,Cerave or Aveeno Cream 3 times a day for the dry skin            Dry skin refers to skin that feels dry to touch  Dry skin has a dull surface with a rough, scaly quality  The skin is less pliable and cracked  When dryness is severe, the skin may become inflamed and fissured    Although any body site can be dry, dry skin tends to affect the shins more than any other site  Dry skin is lacking moisture in the outer horny cell layer (stratum corneum) and this results in cracks in the skin surface  Dry skin is also called xerosis, xeroderma or asteatosis (lack of fat)  It can affect males and females of all ages  There is some racial variability in water and lipid content of the skin  Dry skin that starts in early childhood may be one of about 20 types of ichthyosis (fish-scale skin)  There is often a family history of dry skin  Dry skin is commonly seen in people with atopic dermatitis  Nearly everyone > 60 years has dry skin  Dry skin that begins later may be seen in people with certain diseases and conditions  Postmenopausal women  Hypothyroidism  Chronic renal disease   Malnutrition and weight loss   Subclinical dermatitis   Treatment with certain drugs such as oral retinoids, diuretics and epidermal growth factor receptor inhibitors      What is the treatment for dry skin? The mainstay of treatment of dry skin and ichthyosis is moisturisers/emollients  They should be applied liberally and often enough to:  Reduce itch   Improve the barrier function   Prevent entry of irritants, bacteria   Reduce transepidermal water loss  How can dry skin be prevented? Eliminate aggravating factors:  Reduce the frequency of bathing  A humidifier in winter and air conditioner in summer   Compare having a short shower with a prolonged soak in a bath  Use lukewarm, not hot, water  Replace standard soap with a substitute such as a synthetic detergent cleanser, water-miscible emollient, bath oil, anti-pruritic tar oil, colloidal oatmeal etc    Apply an emollient liberally and often, particularly shortly after bathing, and when itchy  The drier the skin, the thicker this should be, especially on the hands  What is the outlook for dry skin?   A tendency to dry skin may persist life-long, or it may improve once contributing factors are controlled  5  ACTINIC CHEILITIS   Assessment and Plan:  Based on a thorough discussion of this condition and the management approach to it (including a comprehensive discussion of the known risks, side effects and potential benefits of treatment), the patient (family) agrees to implement the following specific plan:  Liquid nitrogen was applied for 10-12 seconds to the skin lesion and the expected blistering or scabbing reaction explained  Do not pick at the area  Patient reminded to expect hypopigmented scars from the procedure  Return if lesion fails to fully resolve  PROCEDURE:  DESTRUCTION OF PRE-MALIGNANT LESIONS  After a thorough discussion of treatment options and risk/benefits/alternatives (including but not limited to local pain, scarring, dyspigmentation, blistering, and possible superinfection), verbal and written consent were obtained and the aforementioned lesions were treated on with cryotherapy using liquid nitrogen x 1 cycle for 5-10 seconds

## 2022-11-16 ENCOUNTER — APPOINTMENT (OUTPATIENT)
Dept: LAB | Facility: MEDICAL CENTER | Age: 52
End: 2022-11-16

## 2022-11-16 DIAGNOSIS — Z12.11 SPECIAL SCREENING FOR MALIGNANT NEOPLASMS, COLON: ICD-10-CM

## 2022-11-16 DIAGNOSIS — E78.5 HYPERLIPIDEMIA, UNSPECIFIED HYPERLIPIDEMIA TYPE: ICD-10-CM

## 2022-11-16 DIAGNOSIS — E11.9 DIABETES MELLITUS WITHOUT COMPLICATION (HCC): ICD-10-CM

## 2022-11-16 DIAGNOSIS — N40.2 NODULAR PROSTATE WITHOUT URINARY OBSTRUCTION: ICD-10-CM

## 2022-11-16 LAB
CHOLEST SERPL-MCNC: 157 MG/DL
HDLC SERPL-MCNC: 40 MG/DL
LDLC SERPL CALC-MCNC: 97 MG/DL (ref 0–100)
NONHDLC SERPL-MCNC: 117 MG/DL
PSA SERPL-MCNC: 0.4 NG/ML (ref 0–4)
TRIGL SERPL-MCNC: 100 MG/DL

## 2023-11-06 ENCOUNTER — APPOINTMENT (OUTPATIENT)
Dept: LAB | Facility: MEDICAL CENTER | Age: 53
End: 2023-11-06
Payer: COMMERCIAL

## 2023-11-06 DIAGNOSIS — E78.5 HYPERLIPIDEMIA, UNSPECIFIED HYPERLIPIDEMIA TYPE: ICD-10-CM

## 2023-11-06 DIAGNOSIS — N40.0 BENIGN PROSTATIC HYPERPLASIA, UNSPECIFIED WHETHER LOWER URINARY TRACT SYMPTOMS PRESENT: ICD-10-CM

## 2023-11-06 LAB
CHOLEST SERPL-MCNC: 162 MG/DL
HDLC SERPL-MCNC: 40 MG/DL
LDLC SERPL CALC-MCNC: 104 MG/DL (ref 0–100)
NONHDLC SERPL-MCNC: 122 MG/DL
PSA SERPL-MCNC: 0.34 NG/ML (ref 0–4)
TRIGL SERPL-MCNC: 91 MG/DL

## 2023-11-06 PROCEDURE — 84153 ASSAY OF PSA TOTAL: CPT

## 2023-11-06 PROCEDURE — 36415 COLL VENOUS BLD VENIPUNCTURE: CPT

## 2023-11-06 PROCEDURE — 80061 LIPID PANEL: CPT

## 2024-02-21 ENCOUNTER — APPOINTMENT (EMERGENCY)
Dept: RADIOLOGY | Facility: HOSPITAL | Age: 54
End: 2024-02-21
Payer: COMMERCIAL

## 2024-02-21 ENCOUNTER — HOSPITAL ENCOUNTER (EMERGENCY)
Facility: HOSPITAL | Age: 54
Discharge: HOME | End: 2024-02-21
Attending: EMERGENCY MEDICINE
Payer: COMMERCIAL

## 2024-02-21 VITALS
HEART RATE: 55 BPM | OXYGEN SATURATION: 98 % | DIASTOLIC BLOOD PRESSURE: 70 MMHG | SYSTOLIC BLOOD PRESSURE: 125 MMHG | RESPIRATION RATE: 20 BRPM

## 2024-02-21 DIAGNOSIS — R07.9 CHEST PAIN: Primary | ICD-10-CM

## 2024-02-21 LAB
2HR DELTA HS TROPONIN: -1 NG/L
ALBUMIN SERPL BCP-MCNC: 4.3 G/DL (ref 3.5–5)
ALP SERPL-CCNC: 51 U/L (ref 34–104)
ALT SERPL W P-5'-P-CCNC: 23 U/L (ref 7–52)
ANION GAP SERPL CALCULATED.3IONS-SCNC: 6 MMOL/L
AST SERPL W P-5'-P-CCNC: 21 U/L (ref 13–39)
BASOPHILS # BLD AUTO: 0.07 THOUSANDS/ÂΜL (ref 0–0.1)
BASOPHILS NFR BLD AUTO: 1 % (ref 0–1)
BILIRUB SERPL-MCNC: 0.6 MG/DL (ref 0.2–1)
BUN SERPL-MCNC: 10 MG/DL (ref 5–25)
CALCIUM SERPL-MCNC: 9 MG/DL (ref 8.4–10.2)
CARDIAC TROPONIN I PNL SERPL HS: 4 NG/L
CARDIAC TROPONIN I PNL SERPL HS: 5 NG/L
CHLORIDE SERPL-SCNC: 108 MMOL/L (ref 96–108)
CO2 SERPL-SCNC: 23 MMOL/L (ref 21–32)
CREAT SERPL-MCNC: 0.88 MG/DL (ref 0.6–1.3)
D DIMER PPP FEU-MCNC: 0.32 UG/ML FEU
EOSINOPHIL # BLD AUTO: 0.17 THOUSAND/ÂΜL (ref 0–0.61)
EOSINOPHIL NFR BLD AUTO: 2 % (ref 0–6)
ERYTHROCYTE [DISTWIDTH] IN BLOOD BY AUTOMATED COUNT: 11.6 % (ref 11.6–15.1)
GFR SERPL CREATININE-BSD FRML MDRD: 97 ML/MIN/1.73SQ M
GLUCOSE SERPL-MCNC: 88 MG/DL (ref 65–140)
HCT VFR BLD AUTO: 44.4 % (ref 36.5–49.3)
HGB BLD-MCNC: 15.2 G/DL (ref 12–17)
IMM GRANULOCYTES # BLD AUTO: 0.02 THOUSAND/UL (ref 0–0.2)
IMM GRANULOCYTES NFR BLD AUTO: 0 % (ref 0–2)
LYMPHOCYTES # BLD AUTO: 1.97 THOUSANDS/ÂΜL (ref 0.6–4.47)
LYMPHOCYTES NFR BLD AUTO: 26 % (ref 14–44)
MCH RBC QN AUTO: 31.7 PG (ref 26.8–34.3)
MCHC RBC AUTO-ENTMCNC: 34.2 G/DL (ref 31.4–37.4)
MCV RBC AUTO: 93 FL (ref 82–98)
MONOCYTES # BLD AUTO: 0.72 THOUSAND/ÂΜL (ref 0.17–1.22)
MONOCYTES NFR BLD AUTO: 9 % (ref 4–12)
NEUTROPHILS # BLD AUTO: 4.75 THOUSANDS/ÂΜL (ref 1.85–7.62)
NEUTS SEG NFR BLD AUTO: 62 % (ref 43–75)
NRBC BLD AUTO-RTO: 0 /100 WBCS
PLATELET # BLD AUTO: 273 THOUSANDS/UL (ref 149–390)
PMV BLD AUTO: 10.4 FL (ref 8.9–12.7)
POTASSIUM SERPL-SCNC: 4.2 MMOL/L (ref 3.5–5.3)
PROT SERPL-MCNC: 7.5 G/DL (ref 6.4–8.4)
RBC # BLD AUTO: 4.79 MILLION/UL (ref 3.88–5.62)
SODIUM SERPL-SCNC: 137 MMOL/L (ref 135–147)
WBC # BLD AUTO: 7.7 THOUSAND/UL (ref 4.31–10.16)

## 2024-02-21 PROCEDURE — 80053 COMPREHEN METABOLIC PANEL: CPT

## 2024-02-21 PROCEDURE — 85379 FIBRIN DEGRADATION QUANT: CPT

## 2024-02-21 PROCEDURE — 36415 COLL VENOUS BLD VENIPUNCTURE: CPT

## 2024-02-21 PROCEDURE — 71046 X-RAY EXAM CHEST 2 VIEWS: CPT

## 2024-02-21 PROCEDURE — 99285 EMERGENCY DEPT VISIT HI MDM: CPT

## 2024-02-21 PROCEDURE — 99285 EMERGENCY DEPT VISIT HI MDM: CPT | Performed by: EMERGENCY MEDICINE

## 2024-02-21 PROCEDURE — 96374 THER/PROPH/DIAG INJ IV PUSH: CPT

## 2024-02-21 PROCEDURE — 93005 ELECTROCARDIOGRAM TRACING: CPT

## 2024-02-21 PROCEDURE — 84484 ASSAY OF TROPONIN QUANT: CPT

## 2024-02-21 PROCEDURE — 85025 COMPLETE CBC W/AUTO DIFF WBC: CPT

## 2024-02-21 RX ORDER — MORPHINE SULFATE 4 MG/ML
4 INJECTION, SOLUTION INTRAMUSCULAR; INTRAVENOUS ONCE
Status: COMPLETED | OUTPATIENT
Start: 2024-02-21 | End: 2024-02-21

## 2024-02-21 RX ADMIN — MORPHINE SULFATE 4 MG: 4 INJECTION INTRAVENOUS at 12:46

## 2024-02-21 NOTE — ED PROVIDER NOTES
History  Chief Complaint   Patient presents with    Chest Pain     Pt reports CP / SOB that began around 1130 this afternoon, pt states feels like a stabbing pain .     (Joni Mancia) Joni Mancia is a 54 y.o. male who identifies as male    They presented to the emergency department on February 21, 2024. Patient presents with:  Chest Pain: Pt reports CP / SOB that began around 1130 this afternoon, pt states feels like a stabbing pain .  .    The patient states that chest pain started this morning at 1130, stabbing in nature.  Patient also report shortness of breath.  Stated chest pain is worse with inhaling, nonradiating, constant.  Patient rates the pain 10 out of 10 before coming to the ED.  Currently rates the pain a 7 out of 10. Patient just got back from [place] 3 days ago. Patient denies nausea, vomiting, diarrhea, fever, urinary symptoms, or any other complaint at this time.      Allergies include:   -- Amoxicillin    -- Latex    -- Penicillins       Immunizations:    Immunization History  Administered            Date(s) Administered    COVID-19 PFIZER VACCINE 0.3 ML IM                          03/05/2021 03/29/2021 11/20/2021      COVID-19 Pfizer mRNA vacc PF bassam-sucrose 12 yr and older (Comirnaty)                          12/30/2023      COVID-19 Pfizer vac (Bassam-sucrose, gray cap) 12 yr+ IM                          06/21/2022    Immunizations Reviewed.          Prior to Admission Medications   Prescriptions Last Dose Informant Patient Reported? Taking?   diclofenac (VOLTAREN) 75 mg EC tablet   No No   Sig: Take 1 tablet (75 mg total) by mouth 2 (two) times a day   hydrocortisone 2.5 % cream   No No   Sig: Apply topically 4 (four) times a day as needed for irritation or rash   oxyCODONE-acetaminophen (PERCOCET) 5-325 mg per tablet   No No   Sig: Take 1 tablet by mouth every 6 (six) hours as needed for moderate painMax Daily Amount: 4 tablets      Facility-Administered Medications: None        Past Medical History:   Diagnosis Date    FIFI (obstructive sleep apnea)        Past Surgical History:   Procedure Laterality Date    APPENDECTOMY      BARIATRIC SURGERY         Family History   Problem Relation Age of Onset    Diabetes Mother     Kidney disease Mother     Heart disease Mother      I have reviewed and agree with the history as documented.    E-Cigarette/Vaping    E-Cigarette Use Never User      E-Cigarette/Vaping Substances    Nicotine No     THC No     CBD No     Flavoring No     Other No     Unknown No      Social History     Tobacco Use    Smoking status: Never    Smokeless tobacco: Never   Vaping Use    Vaping status: Never Used   Substance Use Topics    Alcohol use: Yes    Drug use: Never        Review of Systems   Constitutional:  Negative for chills and fever.   HENT:  Negative for ear pain and sore throat.    Eyes:  Negative for pain and visual disturbance.   Respiratory:  Positive for shortness of breath. Negative for cough.    Cardiovascular:  Positive for chest pain. Negative for palpitations and leg swelling.   Gastrointestinal:  Negative for abdominal pain and vomiting.   Genitourinary:  Negative for dysuria and hematuria.   Musculoskeletal:  Negative for arthralgias and back pain.   Skin:  Negative for color change and rash.   Neurological:  Negative for seizures and syncope.   All other systems reviewed and are negative.      Physical Exam  ED Triage Vitals   Temp Pulse Respirations Blood Pressure SpO2   -- 02/21/24 1238 02/21/24 1238 02/21/24 1238 02/21/24 1238    55 18 144/90 96 %      Temp src Heart Rate Source Patient Position - Orthostatic VS BP Location FiO2 (%)   -- 02/21/24 1238 02/21/24 1238 02/21/24 1238 --    Monitor Lying Right arm       Pain Score       02/21/24 1246       7             Orthostatic Vital Signs  Vitals:    02/21/24 1238 02/21/24 1447   BP: 144/90 125/70   Pulse: 55 55   Patient Position - Orthostatic VS: Lying Lying       Physical Exam  Vitals and  nursing note reviewed.   Constitutional:       General: He is not in acute distress.     Appearance: He is well-developed.   HENT:      Head: Normocephalic and atraumatic.   Eyes:      Conjunctiva/sclera: Conjunctivae normal.   Cardiovascular:      Rate and Rhythm: Normal rate and regular rhythm.      Heart sounds: No murmur heard.  Pulmonary:      Effort: Pulmonary effort is normal. No respiratory distress.      Breath sounds: Normal breath sounds.   Abdominal:      Palpations: Abdomen is soft.      Tenderness: There is no abdominal tenderness.   Musculoskeletal:         General: No swelling.      Cervical back: Neck supple.   Skin:     General: Skin is warm and dry.      Capillary Refill: Capillary refill takes less than 2 seconds.   Neurological:      Mental Status: He is alert.   Psychiatric:         Mood and Affect: Mood normal.         ED Medications  Medications   morphine injection 4 mg (4 mg Intravenous Given 2/21/24 1246)       Diagnostic Studies  Results Reviewed       Procedure Component Value Units Date/Time    HS Troponin I 2hr [[id number]]  (Normal) Collected: 02/21/24 1435    Lab Status: Final result Specimen: Blood from Arm, Right Updated: 02/21/24 1507     hs TnI 2hr 4 ng/L      Delta 2hr hsTnI -1 ng/L     HS Troponin 0hr (reflex protocol) [[id number]]  (Normal) Collected: 02/21/24 1247    Lab Status: Final result Specimen: Blood from Arm, Right Updated: 02/21/24 1318     hs TnI 0hr 5 ng/L     Comprehensive metabolic panel [[id number]] Collected: 02/21/24 1247    Lab Status: Final result Specimen: Blood from Arm, Right Updated: 02/21/24 1317     Sodium 137 mmol/L      Potassium 4.2 mmol/L      Chloride 108 mmol/L      CO2 23 mmol/L      ANION GAP 6 mmol/L      BUN 10 mg/dL      Creatinine 0.88 mg/dL      Glucose 88 mg/dL      Calcium 9.0 mg/dL      AST 21 U/L      ALT 23 U/L      Alkaline Phosphatase 51 U/L      Total Protein 7.5 g/dL      Albumin 4.3 g/dL      Total Bilirubin 0.60 mg/dL      eGFR 97  ml/min/1.73sq m     Narrative:      National Kidney Disease Foundation guidelines for Chronic Kidney Disease (CKD):     Stage 1 with normal or high GFR (GFR > 90 mL/min/1.73 square meters)    Stage 2 Mild CKD (GFR = 60-89 mL/min/1.73 square meters)    Stage 3A Moderate CKD (GFR = 45-59 mL/min/1.73 square meters)    Stage 3B Moderate CKD (GFR = 30-44 mL/min/1.73 square meters)    Stage 4 Severe CKD (GFR = 15-29 mL/min/1.73 square meters)    Stage 5 End Stage CKD (GFR <15 mL/min/1.73 square meters)  Note: GFR calculation is accurate only with a steady state creatinine    D-dimer, quantitative [[id number]]  (Normal) Collected: 02/21/24 1247    Lab Status: Final result Specimen: Blood from Arm, Right Updated: 02/21/24 1313     D-Dimer, Quant 0.32 ug/ml FEU     Narrative:      In the evaluation for possible pulmonary embolism, in the appropriate (Well's Score of 4 or less) patient, the age adjusted d-dimer cutoff for this patient can be calculated as:    Age x 0.01 (in ug/mL) for Age-adjusted D-dimer exclusion threshold for a patient over 50 years.    CBC and differential [[id number]] Collected: 02/21/24 1247    Lab Status: Final result Specimen: Blood from Arm, Right Updated: 02/21/24 1259     WBC 7.70 Thousand/uL      RBC 4.79 Million/uL      Hemoglobin 15.2 g/dL      Hematocrit 44.4 %      MCV 93 fL      MCH 31.7 pg      MCHC 34.2 g/dL      RDW 11.6 %      MPV 10.4 fL      Platelets 273 Thousands/uL      nRBC 0 /100 WBCs      Neutrophils Relative 62 %      Immat GRANS % 0 %      Lymphocytes Relative 26 %      Monocytes Relative 9 %      Eosinophils Relative 2 %      Basophils Relative 1 %      Neutrophils Absolute 4.75 Thousands/µL      Immature Grans Absolute 0.02 Thousand/uL      Lymphocytes Absolute 1.97 Thousands/µL      Monocytes Absolute 0.72 Thousand/µL      Eosinophils Absolute 0.17 Thousand/µL      Basophils Absolute 0.07 Thousands/µL                    XR chest 2 views   ED Interpretation by David Mcclain  MD Guillermo (02/21 1422)   This film was interpreted independently by me.  No infiltrate, cardiac silhouette normal, no pleural effusions or pulmonary edema.               Procedures  ECG 12 Lead Documentation Only    Date/Time: 2/21/2024 1:05 PM    Performed by: Eden Flores MD  Authorized by: Eden Flores MD    Indications / Diagnosis:  Chest pain  ECG reviewed by me, the ED Provider: yes    Patient location:  ED  Previous ECG:     Previous ECG:  Compared to current    Comparison ECG info:  December 8, 2011    Similarity:  No change    Comparison to cardiac monitor: Yes    Interpretation:     Interpretation: normal    Rate:     ECG rate:  60 bpm  Rhythm:     Rhythm: sinus rhythm    Ectopy:     Ectopy: none    QRS:     QRS axis:  Normal  Conduction:     Conduction: normal    ST segments:     ST segments:  Normal  T waves:     T waves: normal    Comments:      Ventricular rate 60 bpm, NC interval 154 ms, QRS duration 98 ms,  ms, QTc 418 ms  No acute ST segment elevation or depression.  Normal sinus rhythm.        ED Course  ED Course as of 02/21/24 1543   Wed Feb 21, 2024   1306 54-year-old male no significant past medical history presented for evaluation of chest pain that started about an hour ago   1306 CBC and differential  Wnl, no white count, normal H&H.   1337 Comprehensive metabolic panel  Wnl, normal anion gap, no MAHI, normal T. bili   1338 hs TnI 0hr: 5  Will wait for 2hr trop     1437 On further evaluation after administration of morphine, patient reported chest pain has gotten substantially better.   1514 hs TnI 2hr: 4  Due to troponin of -1.  Will discontinue fourth hour.   1523 Patient made aware of unremarkable workup.  Patient stable for discharge.  Patient discharged with return precautions.             HEART Risk Score      Flowsheet Row Most Recent Value   Heart Score Risk Calculator    History 1 Filed at: 02/21/2024 1520   ECG 0 Filed at: 02/21/2024 1520   Age 1  Filed at: 02/21/2024 1520   Risk Factors 1 Filed at: 02/21/2024 1520   Troponin 0 Filed at: 02/21/2024 1520   HEART Score 3 Filed at: 02/21/2024 1520                PERC Rule for PE      Flowsheet Row Most Recent Value   PERC Rule for PE    Age >=50 1 Filed at: 02/21/2024 1247   HR >=100 0 Filed at: 02/21/2024 1247   O2 Sat on room air < 95% 0 Filed at: 02/21/2024 1247   History of PE or DVT 0 Filed at: 02/21/2024 1247   Recent trauma or surgery 0 Filed at: 02/21/2024 1247   Hemoptysis 0 Filed at: 02/21/2024 1247   Exogenous estrogen 0 Filed at: 02/21/2024 1247   Unilateral leg swelling 0 Filed at: 02/21/2024 1247   PERC Rule for PE Results 1 Filed at: 02/21/2024 1247                          Medical Decision Making  Patient presents with:  Chest Pain: Pt reports CP / SOB that began around 1130 this afternoon, pt states feels like a stabbing pain .      Patient seen and examined noted to have chest pain, shortness of breath.      HR:  [55] 55  Resp:  [18-20] 20  BP: (125-144)/(70-90) 125/70    Differential diagnosis includes but is not limited to ACS, PE, musculoskeletal among others.      Due to patient's history and presentation the following laboratory evidence was collected:  Recent Results (from the past 12 hour(s))  -ECG 12 lead:   Collection Time: 02/21/24 12:34 PM       Result                      Value             Ref Range           Ventricular Rate            60                BPM                 Atrial Rate                 60                BPM                 GA Interval                 154               ms                  QRSD Interval               98                ms                  QT Interval                 418               ms                  QTC Interval                418               ms                  P Axis                      25                degrees             QRS Axis                    17                degrees             T Wave Axis                 -8                degrees         -CBC and differential:   Collection Time: 02/21/24 12:47 PM       Result                      Value             Ref Range           WBC                         7.70              4.31 - 10.16*       RBC                         4.79              3.88 - 5.62 *       Hemoglobin                  15.2              12.0 - 17.0 *       Hematocrit                  44.4              36.5 - 49.3 %       MCV                         93                82 - 98 fL          MCH                         31.7              26.8 - 34.3 *       MCHC                        34.2              31.4 - 37.4 *       RDW                         11.6              11.6 - 15.1 %       MPV                         10.4              8.9 - 12.7 fL       Platelets                   273               149 - 390 Th*       nRBC                        0                 /100 WBCs           Neutrophils Relative        62                43 - 75 %           Immat GRANS %               0                 0 - 2 %             Lymphocytes Relative        26                14 - 44 %           Monocytes Relative          9                 4 - 12 %            Eosinophils Relative        2                 0 - 6 %             Basophils Relative          1                 0 - 1 %             Neutrophils Absolute        4.75              1.85 - 7.62 *       Immature Grans Absolute     0.02              0.00 - 0.20 *       Lymphocytes Absolute        1.97              0.60 - 4.47 *       Monocytes Absolute          0.72              0.17 - 1.22 *       Eosinophils Absolute        0.17              0.00 - 0.61 *       Basophils Absolute          0.07              0.00 - 0.10 *  -Comprehensive metabolic panel:   Collection Time: 02/21/24 12:47 PM       Result                      Value             Ref Range           Sodium                      137               135 - 147 mm*       Potassium                   4.2               3.5 - 5.3 mm*       Chloride                     "108               96 - 108 mmo*       CO2                         23                21 - 32 mmol*       ANION GAP                   6                 mmol/L              BUN                         10                5 - 25 mg/dL        Creatinine                  0.88              0.60 - 1.30 *       Glucose                     88                65 - 140 mg/*       Calcium                     9.0               8.4 - 10.2 m*       AST                         21                13 - 39 U/L         ALT                         23                7 - 52 U/L          Alkaline Phosphatase        51                34 - 104 U/L        Total Protein               7.5               6.4 - 8.4 g/*       Albumin                     4.3               3.5 - 5.0 g/*       Total Bilirubin             0.60              0.20 - 1.00 *       eGFR                        97                ml/min/1.73s*  -HS Troponin 0hr (reflex protocol):   Collection Time: 02/21/24 12:47 PM       Result                      Value             Ref Range           hs TnI 0hr                  5                 \"Refer to AC*  -D-dimer, quantitative:   Collection Time: 02/21/24 12:47 PM       Result                      Value             Ref Range           D-Dimer, Quant              0.32              <0.50 ug/ml *  -HS Troponin I 2hr:   Collection Time: 02/21/24  2:35 PM       Result                      Value             Ref Range           hs TnI 2hr                  4                 \"Refer to AC*       Delta 2hr hsTnI             -1                <20 ng/L         Due to patient's history and presentation the following imaging was collected:  XR chest 2 views   ED Interpretation    This film was interpreted independently by me.No infiltrate, cardiac silhouette normal, no pleural effusions or pulmonary edema.      No results found.       EKG: interpreted by myself as above.    Patient was administered:      Medication Administration - last 24 hours from " 02/20/2024 1541 to   02/21/2024 1541       Date/Time Order Dose Route Action Action by     02/21/2024 1246 EST morphine injection 4 mg 4 mg Intravenous Given   Cierra Simms RN        Patient appears well, is nontoxic appearing, patient expresses understanding and agrees with plan of care at this time.  In light of this patient would benefit from outpatient management.    Patient with unremarkable workup.  Patient stable for discharge.  Patient discharged with a follow-up with his PCP.  10 precaution discussed.        Amount and/or Complexity of Data Reviewed  Independent Historian: spouse  Labs: ordered. Decision-making details documented in ED Course.  Radiology: ordered and independent interpretation performed.    Risk  Prescription drug management.          Disposition  Final diagnoses:   Chest pain     Time reflects when diagnosis was documented in both MDM as applicable and the Disposition within this note       Time User Action Codes Description Comment    2/21/2024  3:22 PM Eden Flores Add [R07.9] Chest pain           ED Disposition       ED Disposition   Discharge    Condition   Stable    Date/Time   Wed Feb 21, 2024 1524    Comment   Joni Mancia discharge to home/self care.                   Follow-up Information       Follow up With Specialties Details Why Contact Info    Gallito Orellana DO Family Medicine   [address] [place] [address] [id number] [phone]              Discharge Medication List as of 2/21/2024  3:24 PM        CONTINUE these medications which have NOT CHANGED    Details   diclofenac (VOLTAREN) 75 mg EC tablet Take 1 tablet (75 mg total) by mouth 2 (two) times a day, Starting Fri 10/30/2020, Normal      hydrocortisone 2.5 % cream Apply topically 4 (four) times a day as needed for irritation or rash, Starting Mon 7/2/2018, Normal      oxyCODONE-acetaminophen (PERCOCET) 5-325 mg per tablet Take 1 tablet by mouth every 6 (six) hours as needed for moderate painMax  Daily Amount: 4 tablets, Starting Fri 10/30/2020, Normal           No discharge procedures on file.    PDMP Review       None             ED Provider  Attending physically available and evaluated Joni Mancia. I managed the patient along with the ED Attending.    Electronically Signed by           Eden Flores MD  02/21/24 0689

## 2024-02-21 NOTE — ED ATTENDING ATTESTATION
2/21/2024  I, David Li MD, saw and evaluated the patient. I have discussed the patient with the resident/non-physician practitioner and agree with the resident's/non-physician practitioner's findings, Plan of Care, and MDM as documented in the resident's/non-physician practitioner's note, except where noted. All available labs and Radiology studies were reviewed.  I was present for key portions of any procedure(s) performed by the resident/non-physician practitioner and I was immediately available to provide assistance.       At this point I agree with the current assessment done in the Emergency Department.  I have conducted an independent evaluation of this patient a history and physical is as follows:    S:  Chief Complaint   Patient presents with    Chest Pain     Pt reports CP / SOB that began around 1130 this afternoon, pt states feels like a stabbing pain .     Joni is a 54 y.o. male who presents with the chief complaint of a sharp right sided chest pain that is stabbing in nature and is worse with taking a deep breath.  No similar symptoms in the past.  His pmh is significant for FIFI, and obesity.  He also has a strong family history of cad/mi.  Onset was at 11:30 this morning.  He denies leg pain, swelling.  [place] it was only 4 hours and there w as a layover in [place].  No personal or family history of dvt/pe.    O:  ED Triage Vitals   Temp Pulse Respirations Blood Pressure SpO2   -- 02/21/24 1238 02/21/24 1238 02/21/24 1238 02/21/24 1238    55 18 144/90 96 %      Temp src Heart Rate Source Patient Position - Orthostatic VS BP Location FiO2 (%)   -- 02/21/24 1238 02/21/24 1238 02/21/24 1238 --    Monitor Lying Right arm       Pain Score       02/21/24 1246       7         Physical Exam  Vitals and nursing note reviewed.   Constitutional:       General: He is not in acute distress.     Appearance: He is well-developed.   HENT:      Head:  "Normocephalic and atraumatic.   Eyes:      Pupils: Pupils are equal, round, and reactive to light.   Neck:      Vascular: No JVD.   Cardiovascular:      Rate and Rhythm: Normal rate and regular rhythm.      Heart sounds: Normal heart sounds. No murmur heard.     No friction rub. No gallop.   Pulmonary:      Effort: Pulmonary effort is normal. No respiratory distress.      Breath sounds: Normal breath sounds. No wheezing or rales.   Chest:      Chest wall: No tenderness.   Musculoskeletal:         General: No tenderness. Normal range of motion.      Cervical back: Normal range of motion.   Skin:     General: Skin is warm and dry.   Neurological:      General: No focal deficit present.      Mental Status: He is alert and oriented to person, place, and time.   Psychiatric:         Behavior: Behavior normal.         Thought Content: Thought content normal.         Judgment: Judgment normal.       A/P;  Background: 54 y.o. male with chest pain    Differential DX includes but is not limited to: acs/mi, pe, pleurisy, dissection, pneumonia, musculoskeletal chest pain    Plan: cardiac workup       ED Course     Labs Reviewed   HS TROPONIN I 0HR - Normal       Result Value Ref Range Status    hs TnI 0hr 5  \"Refer to ACS Flowchart\"- see link ng/L Final    Comment:                                              Initial (time 0) result  If >=50 ng/L, Myocardial injury suggested ;  Type of myocardial injury and treatment strategy  to be determined.  If 5-49 ng/L, a delta result at 2 hours and or 4 hours will be needed to further evaluate.  If <4 ng/L, and chest pain has been >3 hours since onset, patient may qualify for discharge based on the HEART score in the ED.  If <5 ng/L and <3hours since onset of chest pain, a delta result at 2 hours will be needed to further evaluate.    HS Troponin 99th Percentile URL of a Health Population=12 ng/L with a 95% Confidence Interval of 8-18 ng/L.    Second Troponin (time 2 hours)  If " "calculated delta >= 20 ng/L,  Myocardial injury suggested ; Type of myocardial injury and treatment strategy to be determined.  If 5-49 ng/L and the calculated delta is 5-19 ng/L, consult medical service for evaluation.  Continue evaluation for ischemia on ecg and other possible etiology and repeat hs troponin at 4 hours.  If delta is <5 ng/L at 2 hours, consider discharge based on risk stratification via the HEART score (if in ED), or MELINDA risk score in IP/Observation.    HS Troponin 99th Percentile URL of a Health Population=12 ng/L with a 95% Confidence Interval of 8-18 ng/L.   D-DIMER, QUANTITATIVE - Normal    D-Dimer, Quant 0.32  <0.50 ug/ml FEU Final    Comment: Reference and upper limits to exclude DVT and PE are the same.  Do not use to exclude if clinical symptoms are present.    Narrative:     In the evaluation for possible pulmonary embolism, in the appropriate (Well's Score of 4 or less) patient, the age adjusted d-dimer cutoff for this patient can be calculated as:    Age x 0.01 (in ug/mL) for Age-adjusted D-dimer exclusion threshold for a patient over 50 years.   HS TROPONIN I 2HR - Normal    hs TnI 2hr 4  \"Refer to ACS Flowchart\"- see link ng/L Final    Comment:                                              Initial (time 0) result  If >=50 ng/L, Myocardial injury suggested ;  Type of myocardial injury and treatment strategy  to be determined.  If 5-49 ng/L, a delta result at 2 hours and or 4 hours will be needed to further evaluate.  If <4 ng/L, and chest pain has been >3 hours since onset, patient may qualify for discharge based on the HEART score in the ED.  If <5 ng/L and <3hours since onset of chest pain, a delta result at 2 hours will be needed to further evaluate.    HS Troponin 99th Percentile URL of a Health Population=12 ng/L with a 95% Confidence Interval of 8-18 ng/L.    Second Troponin (time 2 hours)  If calculated delta >= 20 ng/L,  Myocardial injury suggested ; Type of myocardial injury " and treatment strategy to be determined.  If 5-49 ng/L and the calculated delta is 5-19 ng/L, consult medical service for evaluation.  Continue evaluation for ischemia on ecg and other possible etiology and repeat hs troponin at 4 hours.  If delta is <5 ng/L at 2 hours, consider discharge based on risk stratification via the HEART score (if in ED), or MELINDA risk score in IP/Observation.    HS Troponin 99th Percentile URL of a Health Population=12 ng/L with a 95% Confidence Interval of 8-18 ng/L.    Delta 2hr hsTnI -1  <20 ng/L Final   CBC AND DIFFERENTIAL    WBC 7.70  4.31 - 10.16 Thousand/uL Final    RBC 4.79  3.88 - 5.62 Million/uL Final    Hemoglobin 15.2  12.0 - 17.0 g/dL Final    Hematocrit 44.4  36.5 - 49.3 % Final    MCV 93  82 - 98 fL Final    MCH 31.7  26.8 - 34.3 pg Final    MCHC 34.2  31.4 - 37.4 g/dL Final    RDW 11.6  11.6 - 15.1 % Final    MPV 10.4  8.9 - 12.7 fL Final    Platelets 273  149 - 390 Thousands/uL Final    nRBC 0  /100 WBCs Final    Neutrophils Relative 62  43 - 75 % Final    Immat GRANS % 0  0 - 2 % Final    Lymphocytes Relative 26  14 - 44 % Final    Monocytes Relative 9  4 - 12 % Final    Eosinophils Relative 2  0 - 6 % Final    Basophils Relative 1  0 - 1 % Final    Neutrophils Absolute 4.75  1.85 - 7.62 Thousands/µL Final    Immature Grans Absolute 0.02  0.00 - 0.20 Thousand/uL Final    Lymphocytes Absolute 1.97  0.60 - 4.47 Thousands/µL Final    Monocytes Absolute 0.72  0.17 - 1.22 Thousand/µL Final    Eosinophils Absolute 0.17  0.00 - 0.61 Thousand/µL Final    Basophils Absolute 0.07  0.00 - 0.10 Thousands/µL Final   COMPREHENSIVE METABOLIC PANEL    Sodium 137  135 - 147 mmol/L Final    Potassium 4.2  3.5 - 5.3 mmol/L Final    Chloride 108  96 - 108 mmol/L Final    CO2 23  21 - 32 mmol/L Final    ANION GAP 6  mmol/L Final    BUN 10  5 - 25 mg/dL Final    Creatinine 0.88  0.60 - 1.30 mg/dL Final    Comment: Standardized to IDMS reference method    Glucose 88  65 - 140 mg/dL Final     Comment: If the patient is fasting, the ADA then defines impaired fasting glucose as > 100 mg/dL and diabetes as > or equal to 123 mg/dL.    Calcium 9.0  8.4 - 10.2 mg/dL Final    AST 21  13 - 39 U/L Final    ALT 23  7 - 52 U/L Final    Comment: Specimen collection should occur prior to Sulfasalazine administration due to the potential for falsely depressed results.     Alkaline Phosphatase 51  34 - 104 U/L Final    Total Protein 7.5  6.4 - 8.4 g/dL Final    Albumin 4.3  3.5 - 5.0 g/dL Final    Total Bilirubin 0.60  0.20 - 1.00 mg/dL Final    Comment: Use of this assay is not recommended for patients undergoing treatment with eltrombopag due to the potential for falsely elevated results.  N-acetyl-p-benzoquinone imine (metabolite of Acetaminophen) will generate erroneously low results in samples for patients that have taken an overdose of Acetaminophen.    eGFR 97  ml/min/1.73sq m Final    Narrative:     National Kidney Disease Foundation guidelines for Chronic Kidney Disease (CKD):     Stage 1 with normal or high GFR (GFR > 90 mL/min/1.73 square meters)    Stage 2 Mild CKD (GFR = 60-89 mL/min/1.73 square meters)    Stage 3A Moderate CKD (GFR = 45-59 mL/min/1.73 square meters)    Stage 3B Moderate CKD (GFR = 30-44 mL/min/1.73 square meters)    Stage 4 Severe CKD (GFR = 15-29 mL/min/1.73 square meters)    Stage 5 End Stage CKD (GFR <15 mL/min/1.73 square meters)  Note: GFR calculation is accurate only with a steady state creatinine     XR chest 2 views   ED Interpretation   This film was interpreted independently by me.  No infiltrate, cardiac silhouette normal, no pleural effusions or pulmonary edema.           Medications   morphine injection 4 mg (4 mg Intravenous Given 2/21/24 1246)           Critical Care Time  Procedures    Time reflects when diagnosis was documented in both MDM as applicable and the Disposition within this note       Time User Action Codes Description Comment    2/21/2024  3:22 PM Mark  Eden Tsai Add [R07.9] Chest pain           ED Disposition       ED Disposition   Discharge    Condition   Stable    Date/Time   Wed Feb 21, 2024  3:24 PM    Comment   Joni Mancia discharge to home/self care.                   Follow-up Information       Follow up With Specialties Details Why Contact Info    Gallito Orellana DO Family Medicine   [address] [place] [id number] [phone]

## 2024-02-21 NOTE — DISCHARGE INSTRUCTIONS
Your workup today in the emergency department was unremarkable.  Follow-up with your PCP.  Return sooner to the ED if you experience worsening symptoms.

## 2024-02-22 LAB
ATRIAL RATE: 60 BPM
P AXIS: 25 DEGREES
PR INTERVAL: 154 MS
QRS AXIS: 17 DEGREES
QRSD INTERVAL: 98 MS
QT INTERVAL: 418 MS
QTC INTERVAL: 418 MS
T WAVE AXIS: -8 DEGREES
VENTRICULAR RATE: 60 BPM

## 2024-02-22 PROCEDURE — 93010 ELECTROCARDIOGRAM REPORT: CPT | Performed by: INTERNAL MEDICINE

## 2024-09-10 ENCOUNTER — OFFICE VISIT (OUTPATIENT)
Dept: DERMATOLOGY | Facility: CLINIC | Age: 54
End: 2024-09-10
Payer: COMMERCIAL

## 2024-09-10 VITALS — BODY MASS INDEX: 49.87 KG/M2 | TEMPERATURE: 98.2 F | WEIGHT: 315 LBS

## 2024-09-10 DIAGNOSIS — L71.9 ROSACEA: ICD-10-CM

## 2024-09-10 DIAGNOSIS — D18.01 CHERRY ANGIOMA: ICD-10-CM

## 2024-09-10 DIAGNOSIS — D22.9 MULTIPLE BENIGN MELANOCYTIC NEVI: ICD-10-CM

## 2024-09-10 DIAGNOSIS — Z12.83 SKIN EXAM, SCREENING FOR CANCER: Primary | ICD-10-CM

## 2024-09-10 DIAGNOSIS — L91.8 SKIN TAG: ICD-10-CM

## 2024-09-10 DIAGNOSIS — L82.1 SEBORRHEIC KERATOSIS: ICD-10-CM

## 2024-09-10 DIAGNOSIS — L81.4 LENTIGINES: ICD-10-CM

## 2024-09-10 DIAGNOSIS — L57.0 KERATOSIS, ACTINIC: ICD-10-CM

## 2024-09-10 PROCEDURE — 99214 OFFICE O/P EST MOD 30 MIN: CPT | Performed by: NURSE PRACTITIONER

## 2024-09-10 RX ORDER — FLUOROURACIL 50 MG/G
CREAM TOPICAL 2 TIMES DAILY
Qty: 30 G | Refills: 0 | Status: CANCELLED
Start: 2024-09-10

## 2024-09-10 RX ORDER — FLUOROURACIL 50 MG/G
CREAM TOPICAL 2 TIMES DAILY
Qty: 40 G | Refills: 1 | Status: SHIPPED | OUTPATIENT
Start: 2024-09-10

## 2024-09-10 NOTE — PATIENT INSTRUCTIONS
Physical Exam and Assessment/Plan by Diagnosis:    SEBORRHEIC KERATOSES  - Relevant exam: Scattered over the trunk/extremities are waxy brown to black plaques and papules with stuck on appearance and consistent dermoscopy  - Exam and clinical history consistent with seborrheic keratoses  - Counseled that these are benign growths that do not require treatment    MELANOCYTIC NEVI  -Relevant exam: Scattered over the trunk/extremities are homogenously pigmented brown macules and papules. ELM performed and without concerning findings. No outliers unless otherwise noted in today's note  - Exam and clinical history consistent with melanocytic nevi  - Counseled to return to clinic prior to scheduled appointment should any of these lesions change or should any new lesions of concern arise  - Counseled on use of sun protection daily. Reviewed latest FDA sunscreen guidelines, including use of broad spectrum (UVA and UVB blocking) sunscreen or sun protective clothing with SPF 30-50 every 2-3 hours and reapplied after exposure to water    LENTIGINES  OTHER SKIN CHANGES DUE TO CHRONIC EXPOSURE TO NONIONIZING RADIATION  - Relevant exam: Over sun exposed areas are brown macules. ELM performed and without concerning findings.  - Exam and clinical history consistent with lentigines.  - Counseled to return to clinic prior to scheduled appointment should any of these lesions change or should any new lesions of concern arise.  - Recommended use of sunscreen as above and below.    CHERRY ANGIOMAS  - Relevant exam: Scattered over the trunk/extremities are red papules  - Exam and clinical history consistent with cherry angiomas  - Educated that these are benign    ACTINIC KERATOSES  - Relevant exam: On the scalp are scaly pink macules without palpable dermal component    - Exam and clinical history consistent with actinic keratoses  - Discussed that these lesions are considered premalignant with the potential to evolve into squamous cell  "carcinoma.   - Discussed treatment options, which may inclue liquid nitrogen destruction, topical immunotherapy including risks, benefits  - Patient counseled to return to the office in 4-6 weeks after completion of treatment for recheck if not resolved at which time retreatment or biopsy to rule out SCC will be determined based on clinic findings    OPTION 1:     - The patient agreed to treatment with combination efudex/calcipotriene for **4 days BID to the face; 6 days BID to the extremities/trunk; 7 days BID to the scalp** - Common side effects for this treatment were discussed and handout provided  - Prescription ordered through [facility]. Informed patient that [facility] would contact patient to obtain billing information and shipping address. Patient provided the below preferred contact information for pharmacy:       OPTION 2:     - The patient agreed to treatment with topical efudex 5% for **7 days BID to the face; 14 days BID to the extremities/trunk; 21 days BID to the scalp**   - Common side effects for this treatment were discussed     ACROCHORDON (\"SKIN TAG\")    Assessment and Plan:  Based on a thorough discussion of this condition and the management approach to it (including a comprehensive discussion of the known risks, side effects and potential benefits of treatment), the patient (family) agrees to implement the following specific plan:  Reassure benign.     Skin tags are common, soft, harmless skin lesions that are also called, in the appropriate settings, papillomas, fibroepithelial polyps, and soft fibromas.  They are made up of loosely arranged collagen fibers and blood vessels surrounded by a thickened or thinned-out epidermis.    Skin tags tend to develop in both men and women as we grow older.  They are usually found on the skin folds (neck, armpits, groin).  It is not known what specifically causes skin tags.  Certain factors, though, do appear to play a role:  Chaffing and " irritation from skin rubbing together  High levels of growth factors (as seen, for example, in pregnancy or in acromegaly/gigantism)  Insulin resistance  Human papillomavirus (wart virus)    We discussed that most skin tags do not need to be treated unless they are specifically causing the patient physical distress or limitation or pose a risk for a larger problem such as an infection that forms secondary to excoriation or chronic irritation.    We had a thorough discussion of treatment options and specific risks (including that any procedural treatment may not be covered by insurance and would then be the patient's responsibility) and benefits/alternatives including but not limited to the following:  Cryotherapy (freezing)  Shave removal  Surgical excision (snip excision with scissors)  Electrosurgery  Ligation (we do not do this procedure and counseled against it due to risk of tissue necrosis and infection)    ROSACEA  Assessment and Plan:  Based on a thorough discussion of this condition and the management approach to it (including a comprehensive discussion of the known risks, side effects and potential benefits of treatment), the patient (family) agrees to implement the following specific plan:  Apply metronidazole 0.75% cream twice daily to face area.     Rosacea is a chronic rash affecting the mid-face including the nose, cheeks, chin, forehead, and eyelids. The incidence is usually greatest between the ages of 30-60 years and is more common in people with fair skin. Common characteristics include redness, telangiectasias, papules and pustules over affected areas. Rosacea may look similar to acne, but there is a lack of comedones. Occasionally the eyes may also be involved in ocular rosacea. In advanced disease, enlargement of the sebaceous glands in the nose, termed rhinophyma, may be present.     Rosacea results in red spots (papules) and sometimes pustules over the face, but unlike acne there are no  blackheads, whiteheads, or cystic nodules. Patients often experience increased facial flushing with prominent blood vessels (erythematotelangiectatic rosacea) and dry, sensitive skin. These symptoms are exacerbated by sun exposure, hot or spicy foods, topical steroids and oil-based facial products.     In ocular rosacea, eyelids may be red and sore due to conjunctivitis, keratitis, and episcleritis. If rhinophyma develops due to enlargement of sebaceous glands, the patient may have an enlarged and irregularly shaped nose with prominent pores. In rosacea that is refractory to treatment, patients can develop persistent redness and swelling of the face due to lymphatic obstruction (Morbihan disease).     Distribution around the cheeks may be confused with the malar or “butterfly rash” of lupus. However, the rash of lupus spares the nasal creases and lacks papules and pustules. If signs of photosensitivity, oral ulcers, arthritis, and kidney dysfunction are present then consider referral to a rheumatologist.     There are many potential causes of rosacea including genetic, environmental, vascular, and inflammatory factors. These include, but are not limited to:  Chronic exposure to ultraviolet radiation   Increased immune responses in the form of cathelicidins that promote vessel dilation and infiltration with white blood cells (neutrophils) into the dermis  Increased matrix metalloproteinases such as collagen and elastase that remodel normal tissue may contribute to inflammation of the skin making it thicker and harder  There is some evidence to suggest that increased numbers of demodex mites on patient skin may contribute to rosacea papules     General Treatment Approach   Avoid exacerbating factors such as heat, spicy foods, and alcohol   Use daily SPF30+ sunscreen and other methods of coverage for sun protection  Use water-based make-up   Avoid applying topical steroids to affected areas as they can cause perioral  dermatitis and exacerbate rosacea     Topical Treatment Approach  Metronidazole cream or gel by itself or in combination with oral antibiotics for more severe cases  Azelaic acid cream or lotion is effective for mild inflammatory rosacea when applied twice daily to affected areas  Brimonidine gel and oxymetazoline hydrochloride cream can reduce facial redness temporarily   Ivermectin cream can treat papulopustular rosacea by controlling demodex mites and inflammation   Pimecrolimus cream or tacrolimus ointment twice a day for 2-3 months can help reduce inflammation    Oral Treatment Approach  Antibiotics such as doxycycline, minocycline, or erythromycin for 1-3 months  Clonidine and carvedilol can help reduce facial flushing and are generally well tolerated. Common side effects include low blood pressure, gastrointestinal upset, dry eyes, blurred vision and low heart rate.   Isotretinoin at low doses can be effective for long term treatment when antibiotics fail. Side effects may make it unsuitable for some patients.   NSAIDs such as diclofenac can help reduce discomfort and redness in the skin.     Procedural/Surgical Treatment Approach   Vascular lasers or intense pulsed light treatment may be used to treat persistent telangiectasia and papulopustular rosacea  Plastic surgery and carbon dioxide lasers may be used to treat rhinophyma

## 2024-09-10 NOTE — PROGRESS NOTES
"[facility] Note     Patient Name: Joni Mancia  Encounter Date: 9/10/2024     Have you been cared for by a [facility] Dermatologist in the last 3 years and, if so, which description applies to you?    Yes.  I have been here within the last 3 years, and my medical history has NOT changed since that time.  I am MALE/not capable of bearing children.    REVIEW OF SYSTEMS:  Have you recently had or currently have any of the following? No changes in my recent health.   PAST MEDICAL HISTORY:  Have you personally ever had or currently have any of the following?  If \"YES,\" then please provide more detail. No changes in my medical history.   HISTORY OF IMMUNOSUPPRESSION: Do you have a history of any of the following:  Systemic Immunosuppression such as Diabetes, Biologic or Immunotherapy, Chemotherapy, Organ Transplantation, Bone Marrow Transplantation or Prednisone?  No     Answering \"YES\" requires the addition of the dotphrase \"IMMUNOSUPPRESSED\" as the first diagnosis of the patient's visit.   FAMILY HISTORY:  Any \"first degree relatives\" (parent, brother, sister, or child) with the following?    No changes in my family's known health.   PATIENT EXPERIENCE:    Do you want the Dermatologist to perform a COMPLETE skin exam today including a clinical examination under the \"bra and underwear\" areas?  Yes  If necessary, do we have your permission to call and leave a detailed message on your Preferred Phone number that includes your specific medical information?  Yes      Allergies   Allergen Reactions    Amoxicillin     Latex     Penicillins       Current Outpatient Medications:     diclofenac (VOLTAREN) 75 mg EC tablet, Take 1 tablet (75 mg total) by mouth 2 (two) times a day, Disp: 20 tablet, Rfl: 0    hydrocortisone 2.5 % cream, Apply topically 4 (four) times a day as needed for irritation or rash, Disp: 20 g, Rfl: 0    oxyCODONE-acetaminophen (PERCOCET) 5-325 mg per tablet, Take 1 tablet by mouth every 6 " (six) hours as needed for moderate painMax Daily Amount: 4 tablets, Disp: 5 tablet, Rfl: 0          Whom besides the patient is providing clinical information about today's encounter?   NO ADDITIONAL HISTORIAN (patient alone provided history)    Physical Exam and Assessment/Plan by Diagnosis: Patient last evaluated by Dr. Barrientos on 9/13/22.  He denies any personal or family history of skin cancer.    SEBORRHEIC KERATOSES  - Relevant exam: Scattered over the trunk/extremities are waxy brown to black plaques and papules with stuck on appearance and consistent dermoscopy  - Exam and clinical history consistent with seborrheic keratoses  - Counseled that these are benign growths that do not require treatment    MELANOCYTIC NEVI  -Relevant exam: Scattered over the trunk/extremities are homogenously pigmented brown macules and papules. ELM performed and without concerning findings. No outliers unless otherwise noted in today's note  - Exam and clinical history consistent with melanocytic nevi  - Counseled to return to clinic prior to scheduled appointment should any of these lesions change or should any new lesions of concern arise  - Counseled on use of sun protection daily. Reviewed latest FDA sunscreen guidelines, including use of broad spectrum (UVA and UVB blocking) sunscreen or sun protective clothing with SPF 30-50 every 2-3 hours and reapplied after exposure to water    LENTIGINES  OTHER SKIN CHANGES DUE TO CHRONIC EXPOSURE TO NONIONIZING RADIATION  - Relevant exam: Over sun exposed areas are brown macules. ELM performed and without concerning findings.  - Exam and clinical history consistent with lentigines.  - Counseled to return to clinic prior to scheduled appointment should any of these lesions change or should any new lesions of concern arise.  - Recommended use of sunscreen as above and below.    CHERRY ANGIOMAS  - Relevant exam: Scattered over the trunk/extremities are red papules  - Exam and clinical  "history consistent with cherry angiomas  - Educated that these are benign    ACTINIC KERATOSES  - Relevant exam: On the scalp are scaly pink macules without palpable dermal component    - Exam and clinical history consistent with actinic keratoses  - Discussed that these lesions are considered premalignant with the potential to evolve into squamous cell carcinoma.   - Discussed treatment options, which may inclue liquid nitrogen destruction, topical immunotherapy including risks, benefits  - Patient counseled to return to the office in 4-6 weeks after completion of treatment for recheck if not resolved at which time retreatment or biopsy to rule out SCC will be determined based on clinic findings  -Discussed Efudex 5% cream BID x 2 weeks to scalp.  Reviewed side effects.  Patient will be traveling to [place] next month, and wishes to start therapy in January 2025.  He will return 4-6 weeks after for re-evaluation.    GLEN (\"SKIN TAG\")    Physical Exam:  Anatomic Location Affected:  bilateral axilla  Morphological Description:  flesh colored pedunculated papule  Pertinent Positives:  Pertinent Negatives:    Additional History of Present Condition:  Noted on exam.     Assessment and Plan:  Based on a thorough discussion of this condition and the management approach to it (including a comprehensive discussion of the known risks, side effects and potential benefits of treatment), the patient (family) agrees to implement the following specific plan:  Reassure benign.     ROSACEA    Physical Exam:  Anatomic Location Affected: nose  Morphological Description:  Background centrofacial erythema with scattered erythematous papules/pustules and visible telangiectasias.  Pertinent Positives:  Pertinent Negatives:    Additional History of Present Condition:  The patient reports symptom onset began about one year ago. Their symptoms consist of flushing and acne like bumps on cheeks and nose.   The patient notes the " following triggers seem to cause symptoms flares: heat. So far, he has not received any treatment.     Assessment and Plan:  Based on a thorough discussion of this condition and the management approach to it (including a comprehensive discussion of the known risks, side effects and potential benefits of treatment), the patient (family) agrees to implement the following specific plan:  Apply metronidazole 0.75% cream twice daily to face area.   Follow up in 1 year      Scribe Attestation      I,:  Bruna Jimenez MA am acting as a scribe while in the presence of the attending physician.:       I,:  SANA Cheney personally performed the services described in this documentation    as scribed in my presence.:

## 2025-02-26 ENCOUNTER — OFFICE VISIT (OUTPATIENT)
Dept: DERMATOLOGY | Facility: CLINIC | Age: 55
End: 2025-02-26
Payer: COMMERCIAL

## 2025-02-26 VITALS — WEIGHT: 315 LBS | BODY MASS INDEX: 41.75 KG/M2 | TEMPERATURE: 97.2 F | HEIGHT: 73 IN

## 2025-02-26 DIAGNOSIS — L57.0 KERATOSIS, ACTINIC: Primary | ICD-10-CM

## 2025-02-26 PROCEDURE — 17000 DESTRUCT PREMALG LESION: CPT | Performed by: NURSE PRACTITIONER

## 2025-02-26 PROCEDURE — 17003 DESTRUCT PREMALG LES 2-14: CPT | Performed by: NURSE PRACTITIONER

## 2025-02-26 NOTE — PROGRESS NOTES
"[facility] Note     Patient Name: Joni Mancia  Encounter Date: 02/26/2025     Have you been cared for by a [facility] Dermatologist in the last 3 years and, if so, which description applies to you?    Yes.  I have been here within the last 3 years, and my medical history has NOT changed since that time.  I am MALE/not capable of bearing children.    REVIEW OF SYSTEMS:  Have you recently had or currently have any of the following? No changes in my recent health.   PAST MEDICAL HISTORY:  Have you personally ever had or currently have any of the following?  If \"YES,\" then please provide more detail. No changes in my medical history.   HISTORY OF IMMUNOSUPPRESSION: Do you have a history of any of the following:  Systemic Immunosuppression such as Diabetes, Biologic or Immunotherapy, Chemotherapy, Organ Transplantation, Bone Marrow Transplantation or Prednisone?  No     Answering \"YES\" requires the addition of the dotphrase \"IMMUNOSUPPRESSED\" as the first diagnosis of the patient's visit.   FAMILY HISTORY:  Any \"first degree relatives\" (parent, brother, sister, or child) with the following?    No changes in my family's known health.   PATIENT EXPERIENCE:    Do you want the Dermatologist to perform a COMPLETE skin exam today including a clinical examination under the \"bra and underwear\" areas?  NO  If necessary, do we have your permission to call and leave a detailed message on your Preferred Phone number that includes your specific medical information?  Yes      Allergies   Allergen Reactions    Amoxicillin     Latex     Penicillins       Current Outpatient Medications:     diclofenac (VOLTAREN) 75 mg EC tablet, Take 1 tablet (75 mg total) by mouth 2 (two) times a day, Disp: 20 tablet, Rfl: 0    fluorouracil (EFUDEX) 5 % cream, Apply topically 2 (two) times a day To scalp for 2 weeks (Patient not taking: Reported on 2/26/2025), Disp: 40 g, Rfl: 1    hydrocortisone 2.5 % cream, Apply topically 4 (four) " times a day as needed for irritation or rash, Disp: 20 g, Rfl: 0    metroNIDAZOLE (METROCREAM) 0.75 % cream, Apply topically 2 (two) times a day (Patient not taking: Reported on 2/26/2025), Disp: 45 g, Rfl: 4    oxyCODONE-acetaminophen (PERCOCET) 5-325 mg per tablet, Take 1 tablet by mouth every 6 (six) hours as needed for moderate painMax Daily Amount: 4 tablets, Disp: 5 tablet, Rfl: 0          Whom besides the patient is providing clinical information about today's encounter?   NO ADDITIONAL HISTORIAN (patient alone provided history)    Physical Exam and Assessment/Plan by Diagnosis:    ACTINIC KERATOSIS FOLLOW UP    Physical Exam:  Anatomic Location Affected:  scalp, left temple, mid forehead  Morphological Description:  Scaly pink papules  Pertinent Positives:  Pertinent Negatives:    History of Present Illness: Patient last evaluated on 9/10/24 for skin check.  Acinic keratosis noted on scalp, and Efudex 5% cream prescribed to use twice a day for 2 weeks.  Patient states he did not  prescription.  He has no concerns at this time.    Assessment and Plan:  Based on a thorough discussion of this condition and the management approach to it (including a comprehensive discussion of the known risks, side effects and potential benefits of treatment), the patient (family) agrees to implement the following specific plan:  When outside we recommend using a wide brim hat, sunglasses, long sleeve and pants, sunscreen with SPF 30+ with reapplication every 2 hours, or SPF specific clothing   liquid nitrogen to treat areas. Consent obtained. Expect area to blister, crust, and then fall off within 2 weeks. Please use vaseline.                                       PROCEDURE:  DESTRUCTION OF PRE-MALIGNANT LESIONS  After a thorough discussion of treatment options and risk/benefits/alternatives (including but not limited to local pain, scarring, dyspigmentation, blistering, and possible superinfection), verbal and written  consent were obtained and the aforementioned lesions were treated on with cryotherapy using liquid nitrogen x 1 cycle for 5-10 seconds.    TOTAL NUMBER of 5 pre-malignant lesions were treated today on the ANATOMIC LOCATION: scalp, left temple, mid forehead.     The patient tolerated the procedure well, and after-care instructions were provided.    Advised to contact office with any new concerns or reoccurrence  Follow up as scheduled on 9/16/25 for next skin check        Scribe Attestation      I,:  Angeline Mi MA am acting as a scribe while in the presence of the attending physician.:       I,:  SANA Cheney personally performed the services described in this documentation    as scribed in my presence.:

## 2025-03-12 ENCOUNTER — APPOINTMENT (OUTPATIENT)
Dept: RADIOLOGY | Facility: MEDICAL CENTER | Age: 55
End: 2025-03-12
Payer: COMMERCIAL

## 2025-03-12 DIAGNOSIS — J22 UNSPECIFIED ACUTE LOWER RESPIRATORY INFECTION: ICD-10-CM

## 2025-03-12 PROCEDURE — 71046 X-RAY EXAM CHEST 2 VIEWS: CPT

## 2025-04-05 ENCOUNTER — HOSPITAL ENCOUNTER (EMERGENCY)
Facility: HOSPITAL | Age: 55
Discharge: HOME | End: 2025-04-05
Attending: EMERGENCY MEDICINE
Payer: COMMERCIAL

## 2025-04-05 ENCOUNTER — APPOINTMENT (EMERGENCY)
Dept: RADIOLOGY | Facility: HOSPITAL | Age: 55
End: 2025-04-05
Payer: COMMERCIAL

## 2025-04-05 VITALS
TEMPERATURE: 97.7 F | BODY MASS INDEX: 40.43 KG/M2 | WEIGHT: 315 LBS | HEART RATE: 77 BPM | HEIGHT: 74 IN | SYSTOLIC BLOOD PRESSURE: 149 MMHG | RESPIRATION RATE: 20 BRPM | OXYGEN SATURATION: 100 % | DIASTOLIC BLOOD PRESSURE: 87 MMHG

## 2025-04-05 DIAGNOSIS — K57.92 DIVERTICULITIS: Primary | ICD-10-CM

## 2025-04-05 DIAGNOSIS — R11.0 NAUSEA: ICD-10-CM

## 2025-04-05 DIAGNOSIS — R10.32 LEFT LOWER QUADRANT ABDOMINAL PAIN: ICD-10-CM

## 2025-04-05 LAB
ALBUMIN SERPL BCG-MCNC: 4.1 G/DL (ref 3.5–5)
ALP SERPL-CCNC: 56 U/L (ref 34–104)
ALT SERPL W P-5'-P-CCNC: 26 U/L (ref 7–52)
ANION GAP SERPL CALCULATED.3IONS-SCNC: 8 MMOL/L (ref 4–13)
AST SERPL W P-5'-P-CCNC: 20 U/L (ref 13–39)
BASOPHILS # BLD AUTO: 0.05 THOUSANDS/ÂΜL (ref 0–0.1)
BASOPHILS NFR BLD AUTO: 0 % (ref 0–1)
BILIRUB SERPL-MCNC: 0.59 MG/DL (ref 0.2–1)
BILIRUB UR QL STRIP: NEGATIVE
BUN SERPL-MCNC: 10 MG/DL (ref 5–25)
CALCIUM SERPL-MCNC: 8.8 MG/DL (ref 8.4–10.2)
CHLORIDE SERPL-SCNC: 105 MMOL/L (ref 96–108)
CLARITY UR: CLEAR
CO2 SERPL-SCNC: 23 MMOL/L (ref 21–32)
COLOR UR: YELLOW
CREAT SERPL-MCNC: 0.86 MG/DL (ref 0.6–1.3)
EOSINOPHIL # BLD AUTO: 0.22 THOUSAND/ÂΜL (ref 0–0.61)
EOSINOPHIL NFR BLD AUTO: 2 % (ref 0–6)
ERYTHROCYTE [DISTWIDTH] IN BLOOD BY AUTOMATED COUNT: 11.5 % (ref 11.6–15.1)
GFR SERPL CREATININE-BSD FRML MDRD: 97 ML/MIN/1.73SQ M
GLUCOSE SERPL-MCNC: 107 MG/DL (ref 65–140)
GLUCOSE UR STRIP-MCNC: NEGATIVE MG/DL
HCT VFR BLD AUTO: 43.8 % (ref 36.5–49.3)
HGB BLD-MCNC: 15.3 G/DL (ref 12–17)
HGB UR QL STRIP.AUTO: NEGATIVE
IMM GRANULOCYTES # BLD AUTO: 0.06 THOUSAND/UL (ref 0–0.2)
IMM GRANULOCYTES NFR BLD AUTO: 1 % (ref 0–2)
KETONES UR STRIP-MCNC: NEGATIVE MG/DL
LEUKOCYTE ESTERASE UR QL STRIP: NEGATIVE
LIPASE SERPL-CCNC: 36 U/L (ref 11–82)
LYMPHOCYTES # BLD AUTO: 1.98 THOUSANDS/ÂΜL (ref 0.6–4.47)
LYMPHOCYTES NFR BLD AUTO: 15 % (ref 14–44)
MCH RBC QN AUTO: 32.1 PG (ref 26.8–34.3)
MCHC RBC AUTO-ENTMCNC: 34.9 G/DL (ref 31.4–37.4)
MCV RBC AUTO: 92 FL (ref 82–98)
MONOCYTES # BLD AUTO: 1.32 THOUSAND/ÂΜL (ref 0.17–1.22)
MONOCYTES NFR BLD AUTO: 10 % (ref 4–12)
NEUTROPHILS # BLD AUTO: 9.53 THOUSANDS/ÂΜL (ref 1.85–7.62)
NEUTS SEG NFR BLD AUTO: 72 % (ref 43–75)
NITRITE UR QL STRIP: NEGATIVE
NRBC BLD AUTO-RTO: 0 /100 WBCS
PH UR STRIP.AUTO: 8.5 [PH] (ref 4.5–8)
PLATELET # BLD AUTO: 272 THOUSANDS/UL (ref 149–390)
PMV BLD AUTO: 10.3 FL (ref 8.9–12.7)
POTASSIUM SERPL-SCNC: 4 MMOL/L (ref 3.5–5.3)
PROT SERPL-MCNC: 7.4 G/DL (ref 6.4–8.4)
PROT UR STRIP-MCNC: NEGATIVE MG/DL
RBC # BLD AUTO: 4.77 MILLION/UL (ref 3.88–5.62)
SODIUM SERPL-SCNC: 136 MMOL/L (ref 135–147)
SP GR UR STRIP.AUTO: 1.02 (ref 1–1.03)
UROBILINOGEN UR QL STRIP.AUTO: 1 E.U./DL
WBC # BLD AUTO: 13.16 THOUSAND/UL (ref 4.31–10.16)

## 2025-04-05 PROCEDURE — 99284 EMERGENCY DEPT VISIT MOD MDM: CPT

## 2025-04-05 PROCEDURE — 99285 EMERGENCY DEPT VISIT HI MDM: CPT | Performed by: EMERGENCY MEDICINE

## 2025-04-05 PROCEDURE — 96374 THER/PROPH/DIAG INJ IV PUSH: CPT

## 2025-04-05 PROCEDURE — 96375 TX/PRO/DX INJ NEW DRUG ADDON: CPT

## 2025-04-05 PROCEDURE — 74177 CT ABD & PELVIS W/CONTRAST: CPT

## 2025-04-05 PROCEDURE — 80053 COMPREHEN METABOLIC PANEL: CPT

## 2025-04-05 PROCEDURE — 81003 URINALYSIS AUTO W/O SCOPE: CPT

## 2025-04-05 PROCEDURE — 83690 ASSAY OF LIPASE: CPT

## 2025-04-05 PROCEDURE — 85025 COMPLETE CBC W/AUTO DIFF WBC: CPT

## 2025-04-05 PROCEDURE — 36415 COLL VENOUS BLD VENIPUNCTURE: CPT

## 2025-04-05 RX ORDER — CIPROFLOXACIN 500 MG/1
500 TABLET, FILM COATED ORAL ONCE
Status: COMPLETED | OUTPATIENT
Start: 2025-04-05 | End: 2025-04-05

## 2025-04-05 RX ORDER — ONDANSETRON 2 MG/ML
4 INJECTION INTRAMUSCULAR; INTRAVENOUS ONCE
Status: COMPLETED | OUTPATIENT
Start: 2025-04-05 | End: 2025-04-05

## 2025-04-05 RX ORDER — ONDANSETRON 4 MG/1
4 TABLET, FILM COATED ORAL EVERY 6 HOURS
Qty: 12 TABLET | Refills: 0 | Status: SHIPPED | OUTPATIENT
Start: 2025-04-05

## 2025-04-05 RX ORDER — CIPROFLOXACIN 500 MG/1
500 TABLET, FILM COATED ORAL 2 TIMES DAILY
Qty: 14 TABLET | Refills: 0 | Status: SHIPPED | OUTPATIENT
Start: 2025-04-05 | End: 2025-04-12

## 2025-04-05 RX ORDER — KETOROLAC TROMETHAMINE 30 MG/ML
15 INJECTION, SOLUTION INTRAMUSCULAR; INTRAVENOUS ONCE
Status: COMPLETED | OUTPATIENT
Start: 2025-04-05 | End: 2025-04-05

## 2025-04-05 RX ORDER — CIPROFLOXACIN 500 MG/5ML
500 KIT ORAL EVERY 12 HOURS SCHEDULED
Status: DISCONTINUED | OUTPATIENT
Start: 2025-04-05 | End: 2025-04-05

## 2025-04-05 RX ORDER — METRONIDAZOLE 500 MG/1
500 TABLET ORAL EVERY 8 HOURS SCHEDULED
Qty: 21 TABLET | Refills: 0 | Status: SHIPPED | OUTPATIENT
Start: 2025-04-05 | End: 2025-04-12

## 2025-04-05 RX ORDER — METRONIDAZOLE 500 MG/1
500 TABLET ORAL ONCE
Status: COMPLETED | OUTPATIENT
Start: 2025-04-05 | End: 2025-04-05

## 2025-04-05 RX ADMIN — ONDANSETRON 4 MG: 2 INJECTION INTRAMUSCULAR; INTRAVENOUS at 16:20

## 2025-04-05 RX ADMIN — KETOROLAC TROMETHAMINE 15 MG: 30 INJECTION, SOLUTION INTRAMUSCULAR; INTRAVENOUS at 16:21

## 2025-04-05 RX ADMIN — IOHEXOL 100 ML: 350 INJECTION, SOLUTION INTRAVENOUS at 19:46

## 2025-04-05 RX ADMIN — IOHEXOL 50 ML: 240 INJECTION, SOLUTION INTRATHECAL; INTRAVASCULAR; INTRAVENOUS; ORAL at 17:41

## 2025-04-05 RX ADMIN — CIPROFLOXACIN 500 MG: 500 TABLET, FILM COATED ORAL at 21:27

## 2025-04-05 RX ADMIN — METRONIDAZOLE 500 MG: 500 TABLET ORAL at 21:22

## 2025-04-06 NOTE — ED ATTENDING ATTESTATION
4/5/2025  I, Joni Copeland DO, saw and evaluated the patient. I have discussed the patient with the resident/non-physician practitioner and agree with the resident's/non-physician practitioner's findings, Plan of Care, and MDM as documented in the resident's/non-physician practitioner's note, except where noted. All available labs and Radiology studies were reviewed.  I was present for key portions of any procedure(s) performed by the resident/non-physician practitioner and I was immediately available to provide assistance.       At this point I agree with the current assessment done in the Emergency Department.  I have conducted an independent evaluation of this patient a history and physical is as follows:    55-year-old male left lower quadrant abdominal pain ongoing for few days no diarrhea no bleeding.  History of gastric sleeve cholecystectomy.  Tenderness on exam left lower quadrant no evidence of peritonitis.  Plan CT rule out diverticulitis colitis small bowel obstruction    ED Course     CT interpreted by me as simple uncomplicated diverticulitis will treat with antibiotics follow-up with PCP    Critical Care Time  Procedures

## 2025-04-06 NOTE — DISCHARGE INSTRUCTIONS
You were seen in the emergency department for left lower abdominal pain.  Your CT scan showed diverticulitis.  A prescription for 2 antibiotics was sent to your pharmacy.  A referral to gastroenterology was ordered for follow-up.  If you have worsening symptoms or any new concerning symptoms please return to the emergency department.

## 2025-05-06 ENCOUNTER — TELEPHONE (OUTPATIENT)
Dept: DERMATOLOGY | Facility: CLINIC | Age: 55
End: 2025-05-06

## 2025-05-06 NOTE — TELEPHONE ENCOUNTER
LVM for PT re:apt r/s from [employer] [facility]    Orig apt 9/16/25@7:40am@[facility]  New apt 9/16/25 @9:20am @[facility]    Arrival 15 min prior to apt  PT can call to r/s if necessary  Change will be reflected in [facility]

## 2025-06-09 ENCOUNTER — APPOINTMENT (OUTPATIENT)
Dept: RADIOLOGY | Facility: MEDICAL CENTER | Age: 55
End: 2025-06-09
Payer: COMMERCIAL

## 2025-06-09 DIAGNOSIS — M54.50 LOW BACK PAIN, UNSPECIFIED BACK PAIN LATERALITY, UNSPECIFIED CHRONICITY, UNSPECIFIED WHETHER SCIATICA PRESENT: ICD-10-CM

## 2025-06-09 DIAGNOSIS — M15.0 PRIMARY GENERALIZED (OSTEO)ARTHRITIS: ICD-10-CM

## 2025-06-09 PROCEDURE — 72200 X-RAY EXAM SI JOINTS: CPT

## 2025-06-09 PROCEDURE — 72110 X-RAY EXAM L-2 SPINE 4/>VWS: CPT

## 2025-06-09 PROCEDURE — 73523 X-RAY EXAM HIPS BI 5/> VIEWS: CPT

## 2025-08-06 ENCOUNTER — OFFICE VISIT (OUTPATIENT)
Dept: GASTROENTEROLOGY | Facility: CLINIC | Age: 55
End: 2025-08-06
Payer: COMMERCIAL

## 2025-08-06 VITALS
HEIGHT: 73 IN | TEMPERATURE: 97.5 F | OXYGEN SATURATION: 97 % | HEART RATE: 67 BPM | DIASTOLIC BLOOD PRESSURE: 82 MMHG | BODY MASS INDEX: 41.75 KG/M2 | SYSTOLIC BLOOD PRESSURE: 125 MMHG | WEIGHT: 315 LBS

## 2025-08-06 DIAGNOSIS — K57.92 DIVERTICULITIS: ICD-10-CM

## 2025-08-06 PROCEDURE — 99204 OFFICE O/P NEW MOD 45 MIN: CPT | Performed by: INTERNAL MEDICINE

## 2025-08-06 RX ORDER — TIRZEPATIDE 5 MG/.5ML
5 INJECTION, SOLUTION SUBCUTANEOUS WEEKLY
COMMUNITY
Start: 2025-07-30

## 2025-08-06 RX ORDER — OMEPRAZOLE 10 MG/1
CAPSULE, DELAYED RELEASE ORAL
COMMUNITY

## 2025-08-11 ENCOUNTER — TELEPHONE (OUTPATIENT)
Age: 55
End: 2025-08-11